# Patient Record
Sex: FEMALE | Employment: PART TIME | URBAN - METROPOLITAN AREA
[De-identification: names, ages, dates, MRNs, and addresses within clinical notes are randomized per-mention and may not be internally consistent; named-entity substitution may affect disease eponyms.]

---

## 2023-05-03 DIAGNOSIS — E11.9 TYPE 2 DIABETES MELLITUS WITHOUT COMPLICATION, WITHOUT LONG-TERM CURRENT USE OF INSULIN (MULTI): ICD-10-CM

## 2023-05-03 RX ORDER — METFORMIN HYDROCHLORIDE 500 MG/1
1000 TABLET, EXTENDED RELEASE ORAL 2 TIMES DAILY
COMMUNITY
End: 2023-05-03 | Stop reason: SDUPTHER

## 2023-05-04 RX ORDER — METFORMIN HYDROCHLORIDE 500 MG/1
1000 TABLET, EXTENDED RELEASE ORAL 2 TIMES DAILY
Qty: 360 TABLET | Refills: 0 | Status: SHIPPED | OUTPATIENT
Start: 2023-05-04 | End: 2023-08-17 | Stop reason: SDUPTHER

## 2023-05-11 ENCOUNTER — TELEPHONE (OUTPATIENT)
Dept: PRIMARY CARE | Facility: CLINIC | Age: 68
End: 2023-05-11

## 2023-05-12 DIAGNOSIS — E55.9 VITAMIN D DEFICIENCY: ICD-10-CM

## 2023-05-12 DIAGNOSIS — E78.5 DYSLIPIDEMIA: ICD-10-CM

## 2023-05-12 DIAGNOSIS — E11.9 TYPE 2 DIABETES MELLITUS TREATED WITHOUT INSULIN (MULTI): Primary | ICD-10-CM

## 2023-05-12 DIAGNOSIS — R79.89 ABNORMAL TSH: ICD-10-CM

## 2023-05-15 ENCOUNTER — LAB (OUTPATIENT)
Dept: LAB | Facility: LAB | Age: 68
End: 2023-05-15
Payer: MEDICARE

## 2023-05-15 DIAGNOSIS — E55.9 VITAMIN D DEFICIENCY: ICD-10-CM

## 2023-05-15 DIAGNOSIS — E11.9 TYPE 2 DIABETES MELLITUS TREATED WITHOUT INSULIN (MULTI): ICD-10-CM

## 2023-05-15 DIAGNOSIS — R79.89 ABNORMAL TSH: ICD-10-CM

## 2023-05-15 DIAGNOSIS — E78.5 DYSLIPIDEMIA: ICD-10-CM

## 2023-05-15 LAB
BASOPHILS (10*3/UL) IN BLOOD BY AUTOMATED COUNT: 0.06 X10E9/L (ref 0–0.1)
BASOPHILS/100 LEUKOCYTES IN BLOOD BY AUTOMATED COUNT: 0.9 % (ref 0–2)
EOSINOPHILS (10*3/UL) IN BLOOD BY AUTOMATED COUNT: 0.68 X10E9/L (ref 0–0.7)
EOSINOPHILS/100 LEUKOCYTES IN BLOOD BY AUTOMATED COUNT: 10.3 % (ref 0–6)
ERYTHROCYTE DISTRIBUTION WIDTH (RATIO) BY AUTOMATED COUNT: 12.2 % (ref 11.5–14.5)
ERYTHROCYTE MEAN CORPUSCULAR HEMOGLOBIN CONCENTRATION (G/DL) BY AUTOMATED: 31.7 G/DL (ref 32–36)
ERYTHROCYTE MEAN CORPUSCULAR VOLUME (FL) BY AUTOMATED COUNT: 92 FL (ref 80–100)
ERYTHROCYTES (10*6/UL) IN BLOOD BY AUTOMATED COUNT: 4.76 X10E12/L (ref 4–5.2)
ESTIMATED AVERAGE GLUCOSE FOR HBA1C: 154 MG/DL
HEMATOCRIT (%) IN BLOOD BY AUTOMATED COUNT: 43.8 % (ref 36–46)
HEMOGLOBIN (G/DL) IN BLOOD: 13.9 G/DL (ref 12–16)
HEMOGLOBIN A1C/HEMOGLOBIN TOTAL IN BLOOD: 7 %
IMMATURE GRANULOCYTES/100 LEUKOCYTES IN BLOOD BY AUTOMATED COUNT: 0.2 % (ref 0–0.9)
LEUKOCYTES (10*3/UL) IN BLOOD BY AUTOMATED COUNT: 6.6 X10E9/L (ref 4.4–11.3)
LYMPHOCYTES (10*3/UL) IN BLOOD BY AUTOMATED COUNT: 1.36 X10E9/L (ref 1.2–4.8)
LYMPHOCYTES/100 LEUKOCYTES IN BLOOD BY AUTOMATED COUNT: 20.5 % (ref 13–44)
MONOCYTES (10*3/UL) IN BLOOD BY AUTOMATED COUNT: 0.57 X10E9/L (ref 0.1–1)
MONOCYTES/100 LEUKOCYTES IN BLOOD BY AUTOMATED COUNT: 8.6 % (ref 2–10)
NEUTROPHILS (10*3/UL) IN BLOOD BY AUTOMATED COUNT: 3.94 X10E9/L (ref 1.2–7.7)
NEUTROPHILS/100 LEUKOCYTES IN BLOOD BY AUTOMATED COUNT: 59.5 % (ref 40–80)
NRBC (PER 100 WBCS) BY AUTOMATED COUNT: 0 /100 WBC (ref 0–0)
PLATELETS (10*3/UL) IN BLOOD AUTOMATED COUNT: 287 X10E9/L (ref 150–450)

## 2023-05-15 PROCEDURE — 80061 LIPID PANEL: CPT

## 2023-05-15 PROCEDURE — 82306 VITAMIN D 25 HYDROXY: CPT

## 2023-05-15 PROCEDURE — 83036 HEMOGLOBIN GLYCOSYLATED A1C: CPT

## 2023-05-15 PROCEDURE — 36415 COLL VENOUS BLD VENIPUNCTURE: CPT

## 2023-05-15 PROCEDURE — 80053 COMPREHEN METABOLIC PANEL: CPT

## 2023-05-15 PROCEDURE — 85025 COMPLETE CBC W/AUTO DIFF WBC: CPT

## 2023-05-15 PROCEDURE — 84443 ASSAY THYROID STIM HORMONE: CPT

## 2023-05-16 ENCOUNTER — OFFICE VISIT (OUTPATIENT)
Dept: PRIMARY CARE | Facility: CLINIC | Age: 68
End: 2023-05-16
Payer: MEDICARE

## 2023-05-16 VITALS
BODY MASS INDEX: 26.46 KG/M2 | HEIGHT: 64 IN | HEART RATE: 82 BPM | WEIGHT: 155 LBS | RESPIRATION RATE: 12 BRPM | SYSTOLIC BLOOD PRESSURE: 130 MMHG | DIASTOLIC BLOOD PRESSURE: 78 MMHG

## 2023-05-16 DIAGNOSIS — E78.5 DYSLIPIDEMIA: ICD-10-CM

## 2023-05-16 DIAGNOSIS — Z78.0 ASYMPTOMATIC POSTMENOPAUSAL ESTROGEN DEFICIENCY: ICD-10-CM

## 2023-05-16 DIAGNOSIS — E11.9 TYPE 2 DIABETES MELLITUS WITHOUT COMPLICATION, WITHOUT LONG-TERM CURRENT USE OF INSULIN (MULTI): ICD-10-CM

## 2023-05-16 DIAGNOSIS — Z00.00 ANNUAL PHYSICAL EXAM: ICD-10-CM

## 2023-05-16 DIAGNOSIS — I10 PRIMARY HYPERTENSION: Primary | ICD-10-CM

## 2023-05-16 LAB
ALANINE AMINOTRANSFERASE (SGPT) (U/L) IN SER/PLAS: 11 U/L (ref 7–45)
ALBUMIN (G/DL) IN SER/PLAS: 4.2 G/DL (ref 3.4–5)
ALKALINE PHOSPHATASE (U/L) IN SER/PLAS: 112 U/L (ref 33–136)
ANION GAP IN SER/PLAS: 17 MMOL/L (ref 10–20)
ASPARTATE AMINOTRANSFERASE (SGOT) (U/L) IN SER/PLAS: 17 U/L (ref 9–39)
BILIRUBIN TOTAL (MG/DL) IN SER/PLAS: 0.6 MG/DL (ref 0–1.2)
CALCIDIOL (25 OH VITAMIN D3) (NG/ML) IN SER/PLAS: 64 NG/ML
CALCIUM (MG/DL) IN SER/PLAS: 10.1 MG/DL (ref 8.6–10.6)
CARBON DIOXIDE, TOTAL (MMOL/L) IN SER/PLAS: 25 MMOL/L (ref 21–32)
CHLORIDE (MMOL/L) IN SER/PLAS: 102 MMOL/L (ref 98–107)
CHOLESTEROL (MG/DL) IN SER/PLAS: 187 MG/DL (ref 0–199)
CHOLESTEROL IN HDL (MG/DL) IN SER/PLAS: 58 MG/DL
CHOLESTEROL/HDL RATIO: 3.2
CREATININE (MG/DL) IN SER/PLAS: 0.77 MG/DL (ref 0.5–1.05)
GFR FEMALE: 84 ML/MIN/1.73M2
GLUCOSE (MG/DL) IN SER/PLAS: 120 MG/DL (ref 74–99)
LDL: 102 MG/DL (ref 0–99)
POTASSIUM (MMOL/L) IN SER/PLAS: 4.6 MMOL/L (ref 3.5–5.3)
PROTEIN TOTAL: 7 G/DL (ref 6.4–8.2)
SODIUM (MMOL/L) IN SER/PLAS: 139 MMOL/L (ref 136–145)
THYROTROPIN (MIU/L) IN SER/PLAS BY DETECTION LIMIT <= 0.05 MIU/L: 1.14 MIU/L (ref 0.44–3.98)
TRIGLYCERIDE (MG/DL) IN SER/PLAS: 137 MG/DL (ref 0–149)
UREA NITROGEN (MG/DL) IN SER/PLAS: 25 MG/DL (ref 6–23)
VLDL: 27 MG/DL (ref 0–40)

## 2023-05-16 PROCEDURE — 3051F HG A1C>EQUAL 7.0%<8.0%: CPT | Performed by: INTERNAL MEDICINE

## 2023-05-16 PROCEDURE — 99214 OFFICE O/P EST MOD 30 MIN: CPT | Performed by: INTERNAL MEDICINE

## 2023-05-16 PROCEDURE — 3075F SYST BP GE 130 - 139MM HG: CPT | Performed by: INTERNAL MEDICINE

## 2023-05-16 PROCEDURE — 4010F ACE/ARB THERAPY RXD/TAKEN: CPT | Performed by: INTERNAL MEDICINE

## 2023-05-16 PROCEDURE — G0439 PPPS, SUBSEQ VISIT: HCPCS | Performed by: INTERNAL MEDICINE

## 2023-05-16 PROCEDURE — 3078F DIAST BP <80 MM HG: CPT | Performed by: INTERNAL MEDICINE

## 2023-05-16 PROCEDURE — 1170F FXNL STATUS ASSESSED: CPT | Performed by: INTERNAL MEDICINE

## 2023-05-16 RX ORDER — OMEPRAZOLE 20 MG/1
20 TABLET, DELAYED RELEASE ORAL
COMMUNITY
Start: 2014-06-19

## 2023-05-16 RX ORDER — INSULIN PUMP SYRINGE, 3 ML
1 EACH MISCELLANEOUS AS NEEDED
COMMUNITY
Start: 2021-01-28

## 2023-05-16 RX ORDER — ATORVASTATIN CALCIUM 10 MG/1
10 TABLET, FILM COATED ORAL DAILY
COMMUNITY
Start: 2022-12-19 | End: 2023-09-25 | Stop reason: SDUPTHER

## 2023-05-16 RX ORDER — DAPAGLIFLOZIN 5 MG/1
5 TABLET, FILM COATED ORAL
COMMUNITY
Start: 2021-07-31 | End: 2023-06-09 | Stop reason: SDUPTHER

## 2023-05-16 RX ORDER — LOSARTAN POTASSIUM 25 MG/1
25 TABLET ORAL DAILY
COMMUNITY
End: 2023-05-16 | Stop reason: SDUPTHER

## 2023-05-16 RX ORDER — LOSARTAN POTASSIUM 25 MG/1
25 TABLET ORAL DAILY
Qty: 90 TABLET | Refills: 3 | Status: SHIPPED | OUTPATIENT
Start: 2023-05-16 | End: 2024-05-07 | Stop reason: SDUPTHER

## 2023-05-16 ASSESSMENT — ACTIVITIES OF DAILY LIVING (ADL)
DRESSING: INDEPENDENT
TAKING_MEDICATION: INDEPENDENT
MANAGING_FINANCES: INDEPENDENT
GROCERY_SHOPPING: INDEPENDENT
BATHING: INDEPENDENT
DOING_HOUSEWORK: INDEPENDENT

## 2023-05-16 ASSESSMENT — ENCOUNTER SYMPTOMS
DEPRESSION: 0
LOSS OF SENSATION IN FEET: 0
OCCASIONAL FEELINGS OF UNSTEADINESS: 0

## 2023-05-16 ASSESSMENT — PATIENT HEALTH QUESTIONNAIRE - PHQ9
2. FEELING DOWN, DEPRESSED OR HOPELESS: NOT AT ALL
SUM OF ALL RESPONSES TO PHQ9 QUESTIONS 1 AND 2: 0
1. LITTLE INTEREST OR PLEASURE IN DOING THINGS: NOT AT ALL

## 2023-05-16 NOTE — PROGRESS NOTES
Subjective   Patient ID: Edith Christian is a 67 y.o. female who presents for annual f/up.    HPI  Pt doing well in the interim. Had some R sided flank pain this past week that radiated to front. No GI/ symptoms. Pain has since improved.    Otherwise, health has been good. No yeast infections since Jan 2023. Compliant with meds. Glucose ~100-150 on POC checks.     Review of Systems  Negative unless stated otherwise    Objective   Vitals:    05/16/23 1036   BP: 130/78   Pulse: 82   Resp: 12     GEN: well appearing, NAD  HEENT: MMM, pharynx clear; no notable LAE  EYES: sclera clear, EOMI intact  CV: RRR, nl S1/S2; wwp  PULM: CTAB, no w/r/r; normal WoB on RA  ABD: Soft, NT, ND; no CVA tenderness  EXT:  no asymmetry, no edema  SKIN: Warm, dry, no rash  NEURO: no focal deficits; CN II-XII grossly intact; AOx3    Assessment/Plan   Ms. Edith Christian is a 66 yo F w PMH T2DM, HTN, HLD, presenting for CPE.    #DMII   #Obesity  -A1c 7.5->6.9->6.8->7.0% in 5/2023  -Continue Metformin 1g BID (pt taking this once a day but encouraged to take twice daily)   -Restarted on farxiga since last visit   -Continue farxiga - using PRN diflucan for associated yeast infections  -deferred switching to GLP-1 as no yeast infection since Jan 2023    #HTN   -Was on Lisinopril, switched to losartan d/t cough  -Continue losartan 25 mg daily    #HLD  -Lipids 5/2023 wnl  -C/w atorvastatin 10mg     #GERD  -c/w omeprazole     #HM   -Pap last year through Gyn  -Mammo 3/22 through Gyn (Dr. George); due now though Dr. Sanders   -Cscope 12/2018: will need follow up in 5 years - referred now  -Pneumovax 12/2021 (was not given in 2020)  -Flu shot UTD  -DEXA 04/2021 low bone mass, ten year risk of major fracture 15% and hip fracture 0.5%; repeat now  -Shingrex completed on 2022   -COVID 4x (most recently 10/2022)

## 2023-05-16 NOTE — PATIENT INSTRUCTIONS
Edith,     Call [phone] to schedule colonoscopy with Dr. Eduardo Marinelli 12.14.23 or later.   Call [phone] to schedule bone density test anytime!   See me back in 6 months!     CL

## 2023-05-16 NOTE — PROGRESS NOTES
I reviewed with the resident the medical history and the resident’s findings on physical examination.  I discussed with the resident the patient’s diagnosis and concur with the treatment plan as documented in the resident note.     Mery Lane MD

## 2023-06-09 DIAGNOSIS — E11.9 TYPE 2 DIABETES MELLITUS WITHOUT COMPLICATION, WITHOUT LONG-TERM CURRENT USE OF INSULIN (MULTI): ICD-10-CM

## 2023-06-09 RX ORDER — DAPAGLIFLOZIN 5 MG/1
5 TABLET, FILM COATED ORAL
Qty: 90 TABLET | Refills: 1 | Status: SHIPPED | OUTPATIENT
Start: 2023-06-09 | End: 2023-12-11 | Stop reason: SDUPTHER

## 2023-06-22 ENCOUNTER — PATIENT OUTREACH (OUTPATIENT)
Dept: PRIMARY CARE | Facility: CLINIC | Age: 68
End: 2023-06-22
Payer: MEDICARE

## 2023-06-22 NOTE — PROGRESS NOTES
Edith Christian   1955   [id number]   [phone]   Mery Lane MD    I called pt and left voice message in regards to [facility] Patient Experience at [facility]. I will follow up with pt again.

## 2023-06-25 DIAGNOSIS — K21.9 GASTROESOPHAGEAL REFLUX DISEASE WITHOUT ESOPHAGITIS: ICD-10-CM

## 2023-06-26 RX ORDER — OMEPRAZOLE 20 MG/1
20 CAPSULE, DELAYED RELEASE ORAL EVERY MORNING
Qty: 100 CAPSULE | Refills: 1 | Status: SHIPPED | OUTPATIENT
Start: 2023-06-26 | End: 2023-12-31

## 2023-08-17 DIAGNOSIS — E11.9 TYPE 2 DIABETES MELLITUS WITHOUT COMPLICATION, WITHOUT LONG-TERM CURRENT USE OF INSULIN (MULTI): ICD-10-CM

## 2023-08-17 RX ORDER — METFORMIN HYDROCHLORIDE 500 MG/1
1000 TABLET, EXTENDED RELEASE ORAL 2 TIMES DAILY
Qty: 360 TABLET | Refills: 3 | Status: SHIPPED | OUTPATIENT
Start: 2023-08-17 | End: 2024-05-07 | Stop reason: SDUPTHER

## 2023-09-25 DIAGNOSIS — E78.5 DYSLIPIDEMIA: ICD-10-CM

## 2023-09-27 RX ORDER — ATORVASTATIN CALCIUM 10 MG/1
10 TABLET, FILM COATED ORAL DAILY
Qty: 90 TABLET | Refills: 2 | Status: SHIPPED | OUTPATIENT
Start: 2023-09-27 | End: 2024-05-07 | Stop reason: SDUPTHER

## 2023-11-17 ENCOUNTER — LAB (OUTPATIENT)
Dept: LAB | Facility: LAB | Age: 68
End: 2023-11-17
Payer: MEDICARE

## 2023-11-17 ENCOUNTER — APPOINTMENT (OUTPATIENT)
Dept: PRIMARY CARE | Facility: CLINIC | Age: 68
End: 2023-11-17
Payer: MEDICARE

## 2023-11-17 DIAGNOSIS — E11.9 TYPE 2 DIABETES MELLITUS WITHOUT COMPLICATION, WITHOUT LONG-TERM CURRENT USE OF INSULIN (MULTI): ICD-10-CM

## 2023-11-17 LAB
ANION GAP SERPL CALC-SCNC: 16 MMOL/L (ref 10–20)
BUN SERPL-MCNC: 22 MG/DL (ref 6–23)
CALCIUM SERPL-MCNC: 9.8 MG/DL (ref 8.6–10.6)
CHLORIDE SERPL-SCNC: 100 MMOL/L (ref 98–107)
CO2 SERPL-SCNC: 26 MMOL/L (ref 21–32)
CREAT SERPL-MCNC: 0.77 MG/DL (ref 0.5–1.05)
EST. AVERAGE GLUCOSE BLD GHB EST-MCNC: 151 MG/DL
GFR SERPL CREATININE-BSD FRML MDRD: 84 ML/MIN/1.73M*2
GLUCOSE SERPL-MCNC: 132 MG/DL (ref 74–99)
HBA1C MFR BLD: 6.9 %
POTASSIUM SERPL-SCNC: 4.5 MMOL/L (ref 3.5–5.3)
SODIUM SERPL-SCNC: 137 MMOL/L (ref 136–145)

## 2023-11-17 PROCEDURE — 83036 HEMOGLOBIN GLYCOSYLATED A1C: CPT

## 2023-11-17 PROCEDURE — 80048 BASIC METABOLIC PNL TOTAL CA: CPT

## 2023-11-17 PROCEDURE — 36415 COLL VENOUS BLD VENIPUNCTURE: CPT

## 2023-11-22 ENCOUNTER — OFFICE VISIT (OUTPATIENT)
Dept: PRIMARY CARE | Facility: CLINIC | Age: 68
End: 2023-11-22
Payer: MEDICARE

## 2023-11-22 VITALS
DIASTOLIC BLOOD PRESSURE: 77 MMHG | BODY MASS INDEX: 27.12 KG/M2 | WEIGHT: 158 LBS | SYSTOLIC BLOOD PRESSURE: 137 MMHG | OXYGEN SATURATION: 98 % | HEART RATE: 80 BPM

## 2023-11-22 DIAGNOSIS — Z12.31 BREAST CANCER SCREENING BY MAMMOGRAM: Primary | ICD-10-CM

## 2023-11-22 PROCEDURE — 99397 PER PM REEVAL EST PAT 65+ YR: CPT | Performed by: INTERNAL MEDICINE

## 2023-11-22 PROCEDURE — 1170F FXNL STATUS ASSESSED: CPT | Performed by: INTERNAL MEDICINE

## 2023-11-22 PROCEDURE — 4010F ACE/ARB THERAPY RXD/TAKEN: CPT | Performed by: INTERNAL MEDICINE

## 2023-11-22 PROCEDURE — 3075F SYST BP GE 130 - 139MM HG: CPT | Performed by: INTERNAL MEDICINE

## 2023-11-22 PROCEDURE — 3078F DIAST BP <80 MM HG: CPT | Performed by: INTERNAL MEDICINE

## 2023-11-22 PROCEDURE — 3044F HG A1C LEVEL LT 7.0%: CPT | Performed by: INTERNAL MEDICINE

## 2023-11-22 ASSESSMENT — ENCOUNTER SYMPTOMS
CONSTIPATION: 0
CHILLS: 0
SINUS PAIN: 0
NAUSEA: 0
LOSS OF SENSATION IN FEET: 0
OCCASIONAL FEELINGS OF UNSTEADINESS: 0
ACTIVITY CHANGE: 0
DIFFICULTY URINATING: 0
DEPRESSION: 0
FEVER: 0
DIARRHEA: 0
SHORTNESS OF BREATH: 0
ABDOMINAL PAIN: 0
CHEST TIGHTNESS: 0
TROUBLE SWALLOWING: 0
DYSURIA: 0

## 2023-11-22 ASSESSMENT — ACTIVITIES OF DAILY LIVING (ADL)
DOING_HOUSEWORK: INDEPENDENT
DRESSING: INDEPENDENT
BATHING: INDEPENDENT
MANAGING_FINANCES: INDEPENDENT
TAKING_MEDICATION: INDEPENDENT
GROCERY_SHOPPING: INDEPENDENT

## 2023-11-22 ASSESSMENT — PATIENT HEALTH QUESTIONNAIRE - PHQ9
2. FEELING DOWN, DEPRESSED OR HOPELESS: NOT AT ALL
1. LITTLE INTEREST OR PLEASURE IN DOING THINGS: NOT AT ALL
SUM OF ALL RESPONSES TO PHQ9 QUESTIONS 1 AND 2: 0

## 2023-11-22 NOTE — PATIENT INSTRUCTIONS
Edith,     See me back in 6 months, sooner if needed.   Mammogram will be due 3-, I'll place the order & you can call radiology at [phone] to schedule.       CL

## 2023-11-22 NOTE — PROGRESS NOTES
Subjective   Patient ID: Edith Christian is a 68 y.o. female who presents for annual physical    HPI     She reports she is doing well and has no complaints. Discussed that her A1c didn't improve. Encouraged intermittent fasting for lifestyle modifications.    Review of Systems   Constitutional:  Negative for activity change, chills and fever.   HENT:  Negative for ear discharge, ear pain, sinus pain and trouble swallowing.    Respiratory:  Negative for chest tightness and shortness of breath.    Cardiovascular:  Negative for chest pain.   Gastrointestinal:  Negative for abdominal pain, constipation, diarrhea and nausea.   Genitourinary:  Negative for difficulty urinating and dysuria.       Objective   /77   Pulse 80   Wt 71.7 kg (158 lb)   SpO2 98%   BMI 27.12 kg/m²     Physical Exam  Constitutional:       General: She is not in acute distress.     Appearance: Normal appearance.   HENT:      Head: Normocephalic and atraumatic.      Right Ear: External ear normal.      Left Ear: External ear normal.      Mouth/Throat:      Mouth: Mucous membranes are moist.      Pharynx: Oropharynx is clear.   Eyes:      Extraocular Movements: Extraocular movements intact.      Conjunctiva/sclera: Conjunctivae normal.      Pupils: Pupils are equal, round, and reactive to light.   Cardiovascular:      Rate and Rhythm: Normal rate and regular rhythm.      Heart sounds: No murmur heard.     No gallop.   Pulmonary:      Effort: Pulmonary effort is normal. No respiratory distress.      Breath sounds: Normal breath sounds. No stridor.   Abdominal:      General: Abdomen is flat. Bowel sounds are normal.      Palpations: Abdomen is soft. There is no mass.      Tenderness: There is no abdominal tenderness. There is no guarding.   Musculoskeletal:      Cervical back: Neck supple.   Skin:     General: Skin is warm.      Coloration: Skin is not jaundiced.      Findings: No bruising, erythema or rash.   Neurological:      General: No  focal deficit present.      Mental Status: She is alert and oriented to person, place, and time.   Psychiatric:         Mood and Affect: Mood normal.         Thought Content: Thought content normal.         Assessment/Plan     Ms. Edith Christian is a 66 yo F w PMH T2DM, HTN, HLD, presenting for CPE.     #DMII   #Obesity  -A1c 7.5->6.9->6.8->7.0>6.9  -Continue Metformin 1g BID   -Cont farxiga since (no yeast infection since Jan)  -Discussed w pt starting GLP-1 agonists, elected to defer until next visit in 3 months  -Repeat A1c in 3 months     #HTN   -Continue losartan 25 mg daily     #HLD  -Lipids 5/2023 wnl  -C/w atorvastatin 10mg      #GERD  -c/w omeprazole     Health Maintenance:  1- Due for colon in Dec 2023 (5 years, brother history colon cancer)  2- HPV testing no longer indicated  4- Dexa scan due  5- mammo done in March 2024, next due March 2024  6- Tdap 2016 > 2026   7- shingrix completed in 2022  9- Pt to call [facility] to confirm she completed Pna vaccine (unsure if she got PCV)  10- flu shot completed

## 2023-11-27 NOTE — PROGRESS NOTES
I saw and evaluated the patient. I personally obtained the key and critical portions of the history and physical exam or was physically present for key and critical portions performed by the resident/fellow. I reviewed the resident/fellow's documentation and discussed the patient with the resident/fellow. I agree with the resident/fellow's medical decision making as documented in the note.    Mery Lane MD

## 2023-12-11 DIAGNOSIS — E11.9 TYPE 2 DIABETES MELLITUS WITHOUT COMPLICATION, WITHOUT LONG-TERM CURRENT USE OF INSULIN (MULTI): ICD-10-CM

## 2023-12-11 RX ORDER — DAPAGLIFLOZIN 5 MG/1
5 TABLET, FILM COATED ORAL
Qty: 90 TABLET | Refills: 1 | Status: SHIPPED | OUTPATIENT
Start: 2023-12-11 | End: 2024-05-07 | Stop reason: SDUPTHER

## 2023-12-29 ENCOUNTER — APPOINTMENT (OUTPATIENT)
Dept: RADIOLOGY | Facility: HOSPITAL | Age: 68
End: 2023-12-29
Payer: MEDICARE

## 2023-12-29 DIAGNOSIS — K21.9 GASTROESOPHAGEAL REFLUX DISEASE WITHOUT ESOPHAGITIS: ICD-10-CM

## 2023-12-31 RX ORDER — OMEPRAZOLE 20 MG/1
20 CAPSULE, DELAYED RELEASE ORAL EVERY MORNING
Qty: 100 CAPSULE | Refills: 2 | Status: SHIPPED | OUTPATIENT
Start: 2023-12-31

## 2024-01-22 DIAGNOSIS — Z86.010 PERSONAL HISTORY OF COLONIC POLYPS: ICD-10-CM

## 2024-01-22 RX ORDER — POLYETHYLENE GLYCOL 3350, SODIUM SULFATE ANHYDROUS, SODIUM BICARBONATE, SODIUM CHLORIDE, POTASSIUM CHLORIDE 236; 22.74; 6.74; 5.86; 2.97 G/4L; G/4L; G/4L; G/4L; G/4L
4000 POWDER, FOR SOLUTION ORAL ONCE
Qty: 4000 ML | Refills: 0 | Status: SHIPPED | OUTPATIENT
Start: 2024-01-22 | End: 2024-01-22

## 2024-01-25 ENCOUNTER — HOSPITAL ENCOUNTER (OUTPATIENT)
Dept: RADIOLOGY | Facility: HOSPITAL | Age: 69
Discharge: HOME | End: 2024-01-25
Payer: MEDICARE

## 2024-01-25 DIAGNOSIS — Z78.0 ASYMPTOMATIC POSTMENOPAUSAL ESTROGEN DEFICIENCY: ICD-10-CM

## 2024-01-25 PROCEDURE — 77085 DXA BONE DENSITY AXL VRT FX: CPT

## 2024-04-04 ENCOUNTER — HOSPITAL ENCOUNTER (OUTPATIENT)
Dept: RADIOLOGY | Facility: HOSPITAL | Age: 69
Discharge: HOME | End: 2024-04-04
Payer: MEDICARE

## 2024-04-04 VITALS — BODY MASS INDEX: 26.29 KG/M2 | HEIGHT: 64 IN | WEIGHT: 154 LBS

## 2024-04-04 DIAGNOSIS — Z12.31 BREAST CANCER SCREENING BY MAMMOGRAM: ICD-10-CM

## 2024-04-04 PROCEDURE — 77067 SCR MAMMO BI INCL CAD: CPT

## 2024-04-04 PROCEDURE — 77067 SCR MAMMO BI INCL CAD: CPT | Performed by: STUDENT IN AN ORGANIZED HEALTH CARE EDUCATION/TRAINING PROGRAM

## 2024-04-04 PROCEDURE — 77063 BREAST TOMOSYNTHESIS BI: CPT | Performed by: STUDENT IN AN ORGANIZED HEALTH CARE EDUCATION/TRAINING PROGRAM

## 2024-04-17 ENCOUNTER — APPOINTMENT (OUTPATIENT)
Dept: GASTROENTEROLOGY | Facility: EXTERNAL LOCATION | Age: 69
End: 2024-04-17
Payer: MEDICARE

## 2024-05-03 ENCOUNTER — OFFICE VISIT (OUTPATIENT)
Dept: GASTROENTEROLOGY | Facility: EXTERNAL LOCATION | Age: 69
End: 2024-05-03
Payer: MEDICARE

## 2024-05-03 DIAGNOSIS — Z80.0 FAMILY HISTORY OF MALIGNANT NEOPLASM OF DIGESTIVE ORGANS: ICD-10-CM

## 2024-05-03 DIAGNOSIS — Z12.11 COLON CANCER SCREENING: ICD-10-CM

## 2024-05-03 DIAGNOSIS — Z86.010 PERSONAL HISTORY OF COLONIC POLYPS: ICD-10-CM

## 2024-05-03 DIAGNOSIS — D12.2 BENIGN NEOPLASM OF ASCENDING COLON: ICD-10-CM

## 2024-05-03 DIAGNOSIS — Z86.010 PERSONAL HISTORY OF COLONIC POLYPS: Primary | ICD-10-CM

## 2024-05-03 PROCEDURE — 88305 TISSUE EXAM BY PATHOLOGIST: CPT

## 2024-05-03 PROCEDURE — 88305 TISSUE EXAM BY PATHOLOGIST: CPT | Performed by: STUDENT IN AN ORGANIZED HEALTH CARE EDUCATION/TRAINING PROGRAM

## 2024-05-03 PROCEDURE — 4010F ACE/ARB THERAPY RXD/TAKEN: CPT | Performed by: INTERNAL MEDICINE

## 2024-05-03 PROCEDURE — 45385 COLONOSCOPY W/LESION REMOVAL: CPT | Performed by: INTERNAL MEDICINE

## 2024-05-03 NOTE — PROGRESS NOTES
Patient has a family history of colon cancer and multiple polyps were removed today.  I recommend repeat in 3 years.

## 2024-05-06 ENCOUNTER — LAB REQUISITION (OUTPATIENT)
Dept: LAB | Facility: HOSPITAL | Age: 69
End: 2024-05-06
Payer: MEDICARE

## 2024-05-07 DIAGNOSIS — I10 PRIMARY HYPERTENSION: ICD-10-CM

## 2024-05-07 DIAGNOSIS — E11.9 TYPE 2 DIABETES MELLITUS WITHOUT COMPLICATION, WITHOUT LONG-TERM CURRENT USE OF INSULIN (MULTI): ICD-10-CM

## 2024-05-07 DIAGNOSIS — E78.5 DYSLIPIDEMIA: ICD-10-CM

## 2024-05-07 RX ORDER — BLOOD SUGAR DIAGNOSTIC
STRIP MISCELLANEOUS
COMMUNITY
Start: 2024-01-05 | End: 2024-05-07 | Stop reason: SDUPTHER

## 2024-05-07 RX ORDER — LOSARTAN POTASSIUM 25 MG/1
25 TABLET ORAL DAILY
Qty: 90 TABLET | Refills: 1 | Status: SHIPPED | OUTPATIENT
Start: 2024-05-07

## 2024-05-07 RX ORDER — METFORMIN HYDROCHLORIDE 500 MG/1
1000 TABLET, EXTENDED RELEASE ORAL 2 TIMES DAILY
Qty: 360 TABLET | Refills: 1 | Status: SHIPPED | OUTPATIENT
Start: 2024-05-07

## 2024-05-07 RX ORDER — DAPAGLIFLOZIN 5 MG/1
5 TABLET, FILM COATED ORAL
Qty: 90 TABLET | Refills: 1 | Status: SHIPPED | OUTPATIENT
Start: 2024-05-07

## 2024-05-07 RX ORDER — BLOOD SUGAR DIAGNOSTIC
STRIP MISCELLANEOUS
Qty: 200 STRIP | Refills: 1 | Status: SHIPPED | OUTPATIENT
Start: 2024-05-07

## 2024-05-07 RX ORDER — ATORVASTATIN CALCIUM 10 MG/1
10 TABLET, FILM COATED ORAL DAILY
Qty: 90 TABLET | Refills: 1 | Status: SHIPPED | OUTPATIENT
Start: 2024-05-07

## 2024-05-09 ENCOUNTER — TELEPHONE (OUTPATIENT)
Dept: PRIMARY CARE | Facility: CLINIC | Age: 69
End: 2024-05-09
Payer: MEDICARE

## 2024-05-13 DIAGNOSIS — E11.9 TYPE 2 DIABETES MELLITUS WITHOUT COMPLICATION, WITHOUT LONG-TERM CURRENT USE OF INSULIN (MULTI): Primary | ICD-10-CM

## 2024-05-14 ENCOUNTER — LAB (OUTPATIENT)
Dept: LAB | Facility: LAB | Age: 69
End: 2024-05-14
Payer: MEDICARE

## 2024-05-14 DIAGNOSIS — E11.9 TYPE 2 DIABETES MELLITUS WITHOUT COMPLICATION, WITHOUT LONG-TERM CURRENT USE OF INSULIN (MULTI): ICD-10-CM

## 2024-05-14 LAB
ANION GAP SERPL CALC-SCNC: 14 MMOL/L (ref 10–20)
BUN SERPL-MCNC: 18 MG/DL (ref 6–23)
CALCIUM SERPL-MCNC: 9.6 MG/DL (ref 8.6–10.6)
CHLORIDE SERPL-SCNC: 102 MMOL/L (ref 98–107)
CO2 SERPL-SCNC: 27 MMOL/L (ref 21–32)
CREAT SERPL-MCNC: 0.84 MG/DL (ref 0.5–1.05)
EGFRCR SERPLBLD CKD-EPI 2021: 76 ML/MIN/1.73M*2
EST. AVERAGE GLUCOSE BLD GHB EST-MCNC: 171 MG/DL
GLUCOSE SERPL-MCNC: 132 MG/DL (ref 74–99)
HBA1C MFR BLD: 7.6 %
POTASSIUM SERPL-SCNC: 4.7 MMOL/L (ref 3.5–5.3)
SODIUM SERPL-SCNC: 138 MMOL/L (ref 136–145)

## 2024-05-14 PROCEDURE — 80048 BASIC METABOLIC PNL TOTAL CA: CPT

## 2024-05-14 PROCEDURE — 36415 COLL VENOUS BLD VENIPUNCTURE: CPT

## 2024-05-14 PROCEDURE — 83036 HEMOGLOBIN GLYCOSYLATED A1C: CPT

## 2024-05-16 DIAGNOSIS — E11.9 TYPE 2 DIABETES MELLITUS WITHOUT COMPLICATION, WITHOUT LONG-TERM CURRENT USE OF INSULIN (MULTI): Primary | ICD-10-CM

## 2024-05-16 DIAGNOSIS — B37.31 VULVOVAGINAL CANDIDIASIS: ICD-10-CM

## 2024-05-16 LAB
LABORATORY COMMENT REPORT: NORMAL
PATH REPORT.FINAL DX SPEC: NORMAL
PATH REPORT.GROSS SPEC: NORMAL
PATH REPORT.RELEVANT HX SPEC: NORMAL
PATH REPORT.TOTAL CANCER: NORMAL

## 2024-05-16 RX ORDER — FLUCONAZOLE 150 MG/1
TABLET ORAL
Qty: 6 TABLET | Refills: 0 | Status: SHIPPED | OUTPATIENT
Start: 2024-05-16

## 2024-05-16 RX ORDER — DAPAGLIFLOZIN 10 MG/1
10 TABLET, FILM COATED ORAL DAILY
Qty: 100 TABLET | Refills: 3 | Status: SHIPPED | OUTPATIENT
Start: 2024-05-16 | End: 2025-06-20

## 2024-05-22 ENCOUNTER — APPOINTMENT (OUTPATIENT)
Dept: PRIMARY CARE | Facility: CLINIC | Age: 69
End: 2024-05-22
Payer: MEDICARE

## 2024-09-10 DIAGNOSIS — K21.9 GASTROESOPHAGEAL REFLUX DISEASE WITHOUT ESOPHAGITIS: ICD-10-CM

## 2024-09-10 RX ORDER — OMEPRAZOLE 20 MG/1
20 CAPSULE, DELAYED RELEASE ORAL EVERY MORNING
Qty: 100 CAPSULE | Refills: 2 | Status: SHIPPED | OUTPATIENT
Start: 2024-09-10

## 2024-11-25 ENCOUNTER — APPOINTMENT (OUTPATIENT)
Dept: PRIMARY CARE | Facility: CLINIC | Age: 69
End: 2024-11-25
Payer: MEDICARE

## 2024-11-25 ENCOUNTER — LAB (OUTPATIENT)
Dept: LAB | Facility: LAB | Age: 69
End: 2024-11-25
Payer: MEDICARE

## 2024-11-25 VITALS
SYSTOLIC BLOOD PRESSURE: 142 MMHG | OXYGEN SATURATION: 97 % | HEART RATE: 75 BPM | WEIGHT: 150 LBS | TEMPERATURE: 98.2 F | DIASTOLIC BLOOD PRESSURE: 80 MMHG | BODY MASS INDEX: 24.99 KG/M2 | RESPIRATION RATE: 20 BRPM | HEIGHT: 65 IN

## 2024-11-25 DIAGNOSIS — Z12.31 BREAST CANCER SCREENING BY MAMMOGRAM: ICD-10-CM

## 2024-11-25 DIAGNOSIS — E11.9 TYPE 2 DIABETES MELLITUS WITHOUT COMPLICATION, WITHOUT LONG-TERM CURRENT USE OF INSULIN (MULTI): ICD-10-CM

## 2024-11-25 DIAGNOSIS — Z00.00 ANNUAL PHYSICAL EXAM: Primary | ICD-10-CM

## 2024-11-25 LAB
ALBUMIN SERPL BCP-MCNC: 4.5 G/DL (ref 3.4–5)
ALP SERPL-CCNC: 88 U/L (ref 33–136)
ALT SERPL W P-5'-P-CCNC: 14 U/L (ref 7–45)
ANION GAP SERPL CALC-SCNC: 16 MMOL/L (ref 10–20)
AST SERPL W P-5'-P-CCNC: 16 U/L (ref 9–39)
BILIRUB SERPL-MCNC: 0.7 MG/DL (ref 0–1.2)
BUN SERPL-MCNC: 20 MG/DL (ref 6–23)
CALCIUM SERPL-MCNC: 9.7 MG/DL (ref 8.6–10.6)
CHLORIDE SERPL-SCNC: 104 MMOL/L (ref 98–107)
CHOLEST SERPL-MCNC: 177 MG/DL (ref 0–199)
CHOLESTEROL/HDL RATIO: 3
CO2 SERPL-SCNC: 24 MMOL/L (ref 21–32)
CREAT SERPL-MCNC: 0.77 MG/DL (ref 0.5–1.05)
CREAT UR-MCNC: 22.7 MG/DL (ref 20–320)
EGFRCR SERPLBLD CKD-EPI 2021: 84 ML/MIN/1.73M*2
EST. AVERAGE GLUCOSE BLD GHB EST-MCNC: 169 MG/DL
GLUCOSE SERPL-MCNC: 117 MG/DL (ref 74–99)
HBA1C MFR BLD: 7.5 %
HDLC SERPL-MCNC: 59.9 MG/DL
LDLC SERPL CALC-MCNC: 94 MG/DL
MICROALBUMIN UR-MCNC: <7 MG/L
MICROALBUMIN/CREAT UR: NORMAL MG/G{CREAT}
NON HDL CHOLESTEROL: 117 MG/DL (ref 0–149)
POTASSIUM SERPL-SCNC: 4.3 MMOL/L (ref 3.5–5.3)
PROT SERPL-MCNC: 6.9 G/DL (ref 6.4–8.2)
SODIUM SERPL-SCNC: 140 MMOL/L (ref 136–145)
TRIGL SERPL-MCNC: 117 MG/DL (ref 0–149)
VLDL: 23 MG/DL (ref 0–40)

## 2024-11-25 PROCEDURE — 82570 ASSAY OF URINE CREATININE: CPT

## 2024-11-25 PROCEDURE — 36415 COLL VENOUS BLD VENIPUNCTURE: CPT

## 2024-11-25 PROCEDURE — 3048F LDL-C <100 MG/DL: CPT | Performed by: STUDENT IN AN ORGANIZED HEALTH CARE EDUCATION/TRAINING PROGRAM

## 2024-11-25 PROCEDURE — 1170F FXNL STATUS ASSESSED: CPT | Performed by: STUDENT IN AN ORGANIZED HEALTH CARE EDUCATION/TRAINING PROGRAM

## 2024-11-25 PROCEDURE — 3079F DIAST BP 80-89 MM HG: CPT | Performed by: STUDENT IN AN ORGANIZED HEALTH CARE EDUCATION/TRAINING PROGRAM

## 2024-11-25 PROCEDURE — 1159F MED LIST DOCD IN RCRD: CPT | Performed by: STUDENT IN AN ORGANIZED HEALTH CARE EDUCATION/TRAINING PROGRAM

## 2024-11-25 PROCEDURE — 83036 HEMOGLOBIN GLYCOSYLATED A1C: CPT

## 2024-11-25 PROCEDURE — 3062F POS MACROALBUMINURIA REV: CPT | Performed by: STUDENT IN AN ORGANIZED HEALTH CARE EDUCATION/TRAINING PROGRAM

## 2024-11-25 PROCEDURE — 82043 UR ALBUMIN QUANTITATIVE: CPT

## 2024-11-25 PROCEDURE — G0439 PPPS, SUBSEQ VISIT: HCPCS | Performed by: STUDENT IN AN ORGANIZED HEALTH CARE EDUCATION/TRAINING PROGRAM

## 2024-11-25 PROCEDURE — 4010F ACE/ARB THERAPY RXD/TAKEN: CPT | Performed by: STUDENT IN AN ORGANIZED HEALTH CARE EDUCATION/TRAINING PROGRAM

## 2024-11-25 PROCEDURE — 3051F HG A1C>EQUAL 7.0%<8.0%: CPT | Performed by: STUDENT IN AN ORGANIZED HEALTH CARE EDUCATION/TRAINING PROGRAM

## 2024-11-25 PROCEDURE — 3008F BODY MASS INDEX DOCD: CPT | Performed by: STUDENT IN AN ORGANIZED HEALTH CARE EDUCATION/TRAINING PROGRAM

## 2024-11-25 PROCEDURE — 80061 LIPID PANEL: CPT

## 2024-11-25 PROCEDURE — 3077F SYST BP >= 140 MM HG: CPT | Performed by: STUDENT IN AN ORGANIZED HEALTH CARE EDUCATION/TRAINING PROGRAM

## 2024-11-25 PROCEDURE — 80053 COMPREHEN METABOLIC PANEL: CPT

## 2024-11-25 ASSESSMENT — ENCOUNTER SYMPTOMS
TROUBLE SWALLOWING: 0
SHORTNESS OF BREATH: 0
NAUSEA: 0
DIARRHEA: 0
CONSTIPATION: 0
DYSURIA: 0
ACTIVITY CHANGE: 0
DIFFICULTY URINATING: 0
SINUS PAIN: 0
FEVER: 0
CHEST TIGHTNESS: 0
CHILLS: 0
ABDOMINAL PAIN: 0

## 2024-11-25 ASSESSMENT — PATIENT HEALTH QUESTIONNAIRE - PHQ9
SUM OF ALL RESPONSES TO PHQ9 QUESTIONS 1 AND 2: 0
1. LITTLE INTEREST OR PLEASURE IN DOING THINGS: NOT AT ALL
2. FEELING DOWN, DEPRESSED OR HOPELESS: NOT AT ALL

## 2024-11-25 NOTE — PROGRESS NOTES
"Subjective   Patient ID: Edith Christian is a 69 y.o. female with a hx of T2DM, HTN, HLD  presents for establishing care.    HPI     Acute Concerns: Pt wants to know if she can get CGM.    Chronic issues:    Pt reports her blood pressure at home 106/62 in September, but in the office was 151/83, repeat manual was 142/80. Reports not checking her BP too often because her pressure has typically been controlled. Regarding T2DM her A1c increased to 7.6 in May 2024, taking metformin and farxiga without any issue. Reports diet has not been great due to stressors from her  who is being treated for brain cancer right now. Fasted sugars in the morning have been 140-160. Reports going to the [employer] 5x times a week, but is not able to go as often due to  being sick. Review of systems otherwise negative.    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   HENT:  Negative for ear discharge, ear pain, sinus pain and trouble swallowing.    Respiratory:  Negative for chest tightness and shortness of breath.    Cardiovascular:  Negative for chest pain.   Gastrointestinal:  Negative for abdominal pain, constipation, diarrhea and nausea.   Genitourinary:  Negative for difficulty urinating and dysuria.       Objective   /83 (BP Location: Right arm, Patient Position: Sitting, BP Cuff Size: Adult)   Pulse 75   Temp 36.8 °C (98.2 °F) (Oral)   Resp 20   Ht 1.638 m (5' 4.5\")   Wt 68 kg (150 lb)   SpO2 97%   BMI 25.35 kg/m²     Physical Exam  Constitutional:       General: She is not in acute distress.     Appearance: Normal appearance.   HENT:      Head: Normocephalic and atraumatic.      Right Ear: External ear normal.      Left Ear: External ear normal.      Mouth/Throat:      Mouth: Mucous membranes are moist.      Pharynx: Oropharynx is clear.   Eyes: "      Extraocular Movements: Extraocular movements intact.      Conjunctiva/sclera: Conjunctivae normal.      Pupils: Pupils are equal, round, and reactive to light.   Cardiovascular:      Rate and Rhythm: Normal rate and regular rhythm.      Heart sounds: No murmur heard.     No gallop.   Pulmonary:      Effort: Pulmonary effort is normal. No respiratory distress.      Breath sounds: Normal breath sounds. No stridor.   Abdominal:      General: Abdomen is flat. Bowel sounds are normal.      Palpations: Abdomen is soft. There is no mass.      Tenderness: There is no abdominal tenderness. There is no guarding.   Musculoskeletal:      Cervical back: Neck supple.   Skin:     General: Skin is warm.      Coloration: Skin is not jaundiced.      Findings: No bruising, erythema or rash.   Neurological:      General: No focal deficit present.      Mental Status: She is alert and oriented to person, place, and time.   Psychiatric:         Mood and Affect: Mood normal.         Thought Content: Thought content normal.         Assessment/Plan     Ms. Edith Christian is a 68 yo F w PMH T2DM, HTN, HLD, presenting for establishing care.     #DMII   #Obesity  -A1c 7.5->6.9->6.8->7.0>6.9 ->7.6  -Continue Metformin 1g BID   -Cont farxiga 10 mg   -Pharmacy referral for CGM and diabetes management  -Consider GLP in future   -Repeat A1c today, CMP, Lipids, Urine albumin/Cr ratio      #HTN   -Blood pressure 140/80 in office  -Will do ambulatory checks and uptitrate medication as needed  -Continue losartan 25 mg daily     #HLD  -Lipids 5/2023 showed LDL at 102  -Pt not interested in uptitration of statin  -C/w atorvastatin 10mg      #GERD  -c/w omeprazole     RTC in February 2025    Health Maintenance:  1- Due for colonoscopy in may 2027  2- HPV testing no longer indicated  4- Dexa scan due in 2-3 years, osteopenia on Dexa scan  5- mammo done in March 2024, due April 2025  6- Tdap 2016 > 2026   7- shingrix completed in 2022  9- Due for Prevnar  20

## 2024-11-25 NOTE — PATIENT INSTRUCTIONS
Thank you for coming today Edith!    Please get your blood work. You can get this at any  lab, the one here is on the lower level in [address].       Please check your blood pressure daily for the next two weeks and write down your values. Please send me a [employer] message once you have the values.    I am going to have our pharmacy team here reach out to you about getting a continuous glucose monitor and to help with diabetes management.    We discussed that you are due for a prevnar 20 vaccination.     Please follow up in February for blood pressure and diabetes.

## 2024-12-01 ASSESSMENT — ACTIVITIES OF DAILY LIVING (ADL)
MANAGING_FINANCES: INDEPENDENT
DOING_HOUSEWORK: INDEPENDENT
TAKING_MEDICATION: INDEPENDENT
GROCERY_SHOPPING: INDEPENDENT
BATHING: INDEPENDENT
DRESSING: INDEPENDENT

## 2024-12-01 ASSESSMENT — ENCOUNTER SYMPTOMS
DEPRESSION: 0
OCCASIONAL FEELINGS OF UNSTEADINESS: 0
LOSS OF SENSATION IN FEET: 0

## 2024-12-04 ENCOUNTER — APPOINTMENT (OUTPATIENT)
Dept: PHARMACY | Facility: HOSPITAL | Age: 69
End: 2024-12-04
Payer: MEDICARE

## 2024-12-04 DIAGNOSIS — E11.9 TYPE 2 DIABETES MELLITUS WITHOUT COMPLICATION, WITHOUT LONG-TERM CURRENT USE OF INSULIN (MULTI): ICD-10-CM

## 2024-12-04 RX ORDER — BLOOD-GLUCOSE SENSOR
EACH MISCELLANEOUS
Qty: 2 EACH | Refills: 3 | Status: SHIPPED | OUTPATIENT
Start: 2024-12-04

## 2024-12-04 RX ORDER — DAPAGLIFLOZIN 10 MG/1
10 TABLET, FILM COATED ORAL DAILY
Qty: 90 TABLET | Refills: 3 | Status: SHIPPED | OUTPATIENT
Start: 2024-12-04

## 2024-12-04 RX ORDER — BLOOD-GLUCOSE,RECEIVER,CONT
EACH MISCELLANEOUS
Qty: 1 EACH | Refills: 2 | Status: SHIPPED | OUTPATIENT
Start: 2024-12-04

## 2024-12-04 NOTE — ASSESSMENT & PLAN NOTE
Patient's goal A1c is < 7%.  Is pt at goal? No, 7.5  Patient's SMBGs are 140-160s.     Rationale for plan: pt will be getting new insurance near the end of December and confirmed that CGMs will be covered under her new insurance. Will send Dexcom G7 in advance, pt aware she can notify clinical pharmacy if insurance req. Vicki. Pt is also agrees GLP-1 initiation, plan is to start Ozempic .25 mg; however, current insurance has high copays ($250+). Talked with patient about [employer] PAP and is highly interested, pt will send in income documents. Plan is to first enroll patient in [facility] PAP to eliminate high copay costs, send in prescriptions for Farxiga and Ozempic 0.25 mg (to be put on hold till new insurance starts and [facility] PAP approved), send in Dexcom G7, and to monitor fasting BG till next appt. Pt agreed to better diet and to notify clinical pharmacy if drastic changes seen in BG.     Medication Changes:  CONTINUE  Farxiga 10 mg daily  Metformin  mg 2 tabs bid    Future Considerations:  Start Dexcom G7 when new insurance starts. Notify clinical pharmacy if insurance requires other brands  Continue taking home supply of Farxiga 10 mg. New prescription will be sent in but not filled till you get your new insurance and [facility] PAP approved.   Ozempic 0.25 mg will be sent to our [facility] and will be put on hold till new insurance starts, [facility] PAP is accepted, and when Clinical Pharmacy approves after next appt. in January

## 2024-12-04 NOTE — PROGRESS NOTES
Clinical Pharmacy Appointment    Patient ID: Edith Christian is a 69 y.o. female who presents for Diabetes.    Pt is here for First appointment.     Referring Provider: Hawa Hill MD M*      Subjective       HPI  DIABETES MELLITUS TYPE 2:    Diagnosed with diabetes:  NA. Known diabetic complications: NA.  Does patient follow with Endocrinology: No  Last optometry exam: 06/2024  Most recent visit in Podiatry: 06/2024-- patient denies sores or cuts on feet today      Current diabetic medications include:  Farxiga 10 mg daily  Metformin  mg 2 tabs bid    Clarifications to above regimen: NA  Adverse Effects:  pt reported had yeast infections years ago. No reoccurrences since then.     Past diabetic medications include:  NA    Glucose Readings:  Glucometer/CGM Type: glucometer  Patient tests BG 1 times per day in morning    Current home BG readings: 140-160s fasting   Previous home BG readings: NA    Any episodes of hypoglycemia? No,   .  Did patient treat episode of hypoglycemia appropriately? No,    Does the patient have a prescription for ready-to-use Glucagon? Not on insulin  Does pt have proteinuria? No    Lifestyle:  Diet: 3 meals/day. Cereal/fruit for breakfast. Balanced diet.   Physical Activity: [place] end of [place] but stopped since  diagnosed with cancer    Secondary Prevention:  Statin? Yes  ACE-I/ARB? Yes  Aspirin? No    Pertinent PMH Review:  PMH of Pancreatitis: No  PMH of Retinopathy: No  PMH of Urinary Tract Infections: Yes, resolved.   PMH of MTC: No, had breast cancer 22 years ago. Went in to remission 22 years ago.       Medication Reconciliation:  Changed:   Added:   Discontinued:     Drug Interactions  No relevant drug interactions were noted.      Objective   No Known Allergies  Social History     Social History Narrative    Not on file        Vitals  BP Readings from Last 2 Encounters:   11/25/24 142/80   11/22/23 137/77     BMI Readings from Last 1  Encounters:   11/25/24 25.35 kg/m²      Labs  A1C  Lab Results   Component Value Date    HGBA1C 7.5 (H) 11/25/2024    HGBA1C 7.6 (H) 05/14/2024    HGBA1C 6.9 (H) 11/17/2023     BMP  Lab Results   Component Value Date    CALCIUM 9.7 11/25/2024     11/25/2024    K 4.3 11/25/2024    CO2 24 11/25/2024     11/25/2024    BUN 20 11/25/2024    CREATININE 0.77 11/25/2024    EGFR 84 11/25/2024     LFTs  Lab Results   Component Value Date    ALT 14 11/25/2024    AST 16 11/25/2024    ALKPHOS 88 11/25/2024    BILITOT 0.7 11/25/2024     FLP  Lab Results   Component Value Date    TRIG 117 11/25/2024    CHOL 177 11/25/2024    LDLF 102 (H) 05/15/2023    LDLCALC 94 11/25/2024    HDL 59.9 11/25/2024     Urine Microalbumin  Lab Results   Component Value Date    MICROALBCREA  11/25/2024      Comment:      One or more analytes used in this calculation is outside of the analytical measurement range. Calculation cannot be performed.     Weight Management  Wt Readings from Last 3 Encounters:   11/25/24 68 kg (150 lb)   04/04/24 69.9 kg (154 lb)   11/22/23 71.7 kg (158 lb)      There is no height or weight on file to calculate BMI.     Assessment/Plan   Problem List Items Addressed This Visit       Type 2 diabetes mellitus without complication, without long-term current use of insulin (Multi)     Patient's goal A1c is < 7%.  Is pt at goal? No, 7.5  Patient's SMBGs are 140-160s.     Rationale for plan: pt will be getting new insurance near the end of December and confirmed that CGMs will be covered under her new insurance. Will send Dexcom G7 in advance, pt aware she can notify clinical pharmacy if insurance req. Vicki. Pt is also agrees GLP-1 initiation, plan is to start Ozempic .25 mg; however, current insurance has high copays ($250+). Talked with patient about  PAP and is highly interested, pt will send in income documents. Plan is to first enroll patient in  PAP to eliminate high copay costs, send in prescriptions for  Farxiga and Ozempic 0.25 mg (to be put on hold till new insurance starts and  PAP approved), send in Dexcom G7, and to monitor fasting BG till next appt. Pt agreed to better diet and to notify clinical pharmacy if drastic changes seen in BG.     Medication Changes:  CONTINUE  Farxiga 10 mg daily  Metformin  mg 2 tabs bid    Future Considerations:  Start Dexcom G7 when new insurance starts. Notify clinical pharmacy if insurance requires other brands  Continue taking home supply of Farxiga 10 mg. New prescription will be sent in but not filled till you get your new insurance and  PAP approved.   Ozempic 0.25 mg will be sent to our [facility] and will be put on hold till new insurance starts,  PAP is accepted, and when Clinical Pharmacy approves after next appt. in January         Relevant Orders    Referral to Clinical Pharmacy      Patient Assistance Program (PAP)    Application for program to be submitted for the following medications: Farxiga and Ozempic    [facility] Permanent Address: [facility]   Prescription Insurance:   Yes   Members of Household: 2   Files Taxes: No       Patient will be email financial information to pharmacist directly at [email or web address].    Patient verbally reports monthly or yearly income which is less than 400% federal poverty level    Patient aware this process may take up to 6 weeks.     If approved medication must be filled through [facility] and MEDICATION WILL BE MAILED TO PATIENT.      Clinical Pharmacist follow-up: 4 weeks 01/08/2025 10 am,    Continue all meds under the continuation of care with the referring provider and clinical pharmacy team.    Thank you,  Nay Garcia PharmD [facility]  Clinical Pharmacy Resident    Verbal consent to manage patient's drug therapy was obtained from the patient. They were informed they may decline to participate or withdraw from participation in pharmacy services at any time.

## 2024-12-17 DIAGNOSIS — I10 PRIMARY HYPERTENSION: ICD-10-CM

## 2024-12-17 RX ORDER — LOSARTAN POTASSIUM 25 MG/1
25 TABLET ORAL DAILY
Qty: 90 TABLET | Refills: 1 | Status: SHIPPED | OUTPATIENT
Start: 2024-12-17 | End: 2024-12-18 | Stop reason: SDUPTHER

## 2024-12-18 DIAGNOSIS — I10 PRIMARY HYPERTENSION: ICD-10-CM

## 2024-12-18 DIAGNOSIS — Z00.00 ROUTINE GENERAL MEDICAL EXAMINATION AT A HEALTH CARE FACILITY: ICD-10-CM

## 2024-12-18 RX ORDER — LOSARTAN POTASSIUM 25 MG/1
25 TABLET ORAL DAILY
Qty: 90 TABLET | Refills: 1 | Status: SHIPPED | OUTPATIENT
Start: 2024-12-18

## 2025-01-08 ENCOUNTER — APPOINTMENT (OUTPATIENT)
Dept: PHARMACY | Facility: HOSPITAL | Age: 70
End: 2025-01-08
Payer: MEDICARE

## 2025-01-08 DIAGNOSIS — E11.9 TYPE 2 DIABETES MELLITUS WITHOUT COMPLICATION, WITHOUT LONG-TERM CURRENT USE OF INSULIN (MULTI): ICD-10-CM

## 2025-01-08 NOTE — PROGRESS NOTES
Clinical Pharmacy Appointment    Patient ID: Edith Christian is a 69 y.o. female who presents for Diabetes.    Pt is here for Follow Up appointment.     Referring Provider: Hawa Hill MD M*      Subjective       HPI  DIABETES MELLITUS TYPE 2:    Diagnosed with diabetes:  NA. Known diabetic complications: NA.  Does patient follow with Endocrinology: No  Last optometry exam: 06/2024  Most recent visit in Podiatry: 06/2024-- patient denies sores or cuts on feet today      Current diabetic medications include:  Farxiga 10 mg daily  Metformin  mg 2 tabs bid    Clarifications to above regimen: NA  Adverse Effects:  pt reported had yeast infections years ago. No reoccurrences since then.     Past diabetic medications include:  NA    Glucose Readings:  Glucometer/CGM Type: glucometer  Patient tests BG 1 times per day in morning    Current home BG readings: 150-170s fasting (holidays)  Previous home BG readings: NA    Any episodes of hypoglycemia? No,   .  Did patient treat episode of hypoglycemia appropriately? No,    Does the patient have a prescription for ready-to-use Glucagon? Not on insulin  Does pt have proteinuria? No    Lifestyle:  Diet: 3 meals/day. Cereal/fruit for breakfast. Balanced diet.   Physical Activity: [place] till end of [place] but stopped since  diagnosed with cancer    Secondary Prevention:  Statin? Yes  ACE-I/ARB? Yes  Aspirin? No    Pertinent PMH Review:  PMH of Pancreatitis: No  PMH of Retinopathy: No  PMH of Urinary Tract Infections: Yes, resolved.   PMH of MTC: No, had breast cancer 22 years ago. Went in to remission 22 years ago.       Medication Reconciliation:  Changed:   Added:   Discontinued:     Drug Interactions  No relevant drug interactions were noted.      Objective   No Known Allergies  Social History     Social History Narrative    Not on file        Vitals  BP Readings from Last 2 Encounters:   11/25/24 142/80   11/22/23 137/77     BMI Readings from  Last 1 Encounters:   11/25/24 25.35 kg/m²      Labs  A1C  Lab Results   Component Value Date    HGBA1C 7.5 (H) 11/25/2024    HGBA1C 7.6 (H) 05/14/2024    HGBA1C 6.9 (H) 11/17/2023     BMP  Lab Results   Component Value Date    CALCIUM 9.7 11/25/2024     11/25/2024    K 4.3 11/25/2024    CO2 24 11/25/2024     11/25/2024    BUN 20 11/25/2024    CREATININE 0.77 11/25/2024    EGFR 84 11/25/2024     LFTs  Lab Results   Component Value Date    ALT 14 11/25/2024    AST 16 11/25/2024    ALKPHOS 88 11/25/2024    BILITOT 0.7 11/25/2024     FLP  Lab Results   Component Value Date    TRIG 117 11/25/2024    CHOL 177 11/25/2024    LDLF 102 (H) 05/15/2023    LDLCALC 94 11/25/2024    HDL 59.9 11/25/2024     Urine Microalbumin  Lab Results   Component Value Date    MICROALBCREA  11/25/2024      Comment:      One or more analytes used in this calculation is outside of the analytical measurement range. Calculation cannot be performed.     Weight Management  Wt Readings from Last 3 Encounters:   11/25/24 68 kg (150 lb)   04/04/24 69.9 kg (154 lb)   11/22/23 71.7 kg (158 lb)      There is no height or weight on file to calculate BMI.     Assessment/Plan   Problem List Items Addressed This Visit       Type 2 diabetes mellitus without complication, without long-term current use of insulin (Multi)     Patient's goal A1c is < 7%.  Is pt at goal? No, 7.5  Patient's SMBGs are 150-170s.     Rationale for plan: pt reported higher fasting BG reading from last visit, explained it was due to holidays.  PAP is pending for Ozempic and Farxiga, will notify patient once its approved. Counseled on how to use ozempic once its delivered. Pt will notify [facility] to fill dexcom with her new insurance. Upon test claim, PA may be required. Pt aware to call clinical pharmacy if PA required to expedite process. Plan is to continue current regiment, start ozempic .25 mg weekly, and to monitor BG daily.     Medication Changes:  CONTINUE  Farxiga  10 mg daily  Metformin  mg 2 tabs bid  START  Ozempic .25 mg weekly    Future Considerations:  Pt expressed interest in decreasing pill burden. Made aware that Ozempic, coupled with positive life style change, we can start decreasing metformin if clinically indicated.            Relevant Orders    Referral to Clinical Pharmacy        Patient Assistance Program (PAP)    Application for program to be submitted for the following medications: Farxiga and Ozempic    County of Permanent Address: [facility]   Prescription Insurance:   Yes   Members of Household: 2   Files Taxes: No       Patient will be email financial information to pharmacist directly at [email or web address].    Patient verbally reports monthly or yearly income which is less than 400% federal poverty level    Patient aware this process may take up to 6 weeks.     If approved medication must be filled through [facility] and MEDICATION WILL BE MAILED TO PATIENT.      Clinical Pharmacist follow-up: 4 weeks 02/05/2025 9am,    Continue all meds under the continuation of care with the referring provider and clinical pharmacy team.    Thank you,  Nay Garcia PharmD [facility]  Clinical Pharmacy Resident    Verbal consent to manage patient's drug therapy was obtained from the patient. They were informed they may decline to participate or withdraw from participation in pharmacy services at any time.

## 2025-01-08 NOTE — ASSESSMENT & PLAN NOTE
Patient's goal A1c is < 7%.  Is pt at goal? No, 7.5  Patient's SMBGs are 150-170s.     Rationale for plan: pt reported higher fasting BG reading from last visit, explained it was due to holidays.  PAP is pending for Ozempic and Farxiga, will notify patient once its approved. Counseled on how to use ozempic once its delivered. Pt will notify [facility] to fill dexcom with her new insurance. Upon test claim, PA may be required. Pt aware to call clinical pharmacy if PA required to expedite process. Plan is to continue current regiment, start ozempic .25 mg weekly, and to monitor BG daily.     Medication Changes:  CONTINUE  Farxiga 10 mg daily  Metformin  mg 2 tabs bid  START  Ozempic .25 mg weekly    Future Considerations:  Pt expressed interest in decreasing pill burden. Made aware that Ozempic, coupled with positive life style change, we can start decreasing metformin if clinically indicated.

## 2025-01-09 PROCEDURE — RXMED WILLOW AMBULATORY MEDICATION CHARGE

## 2025-01-13 ENCOUNTER — PHARMACY VISIT (OUTPATIENT)
Dept: PHARMACY | Facility: CLINIC | Age: 70
End: 2025-01-13
Payer: COMMERCIAL

## 2025-01-16 DIAGNOSIS — E78.5 DYSLIPIDEMIA: ICD-10-CM

## 2025-01-16 RX ORDER — ATORVASTATIN CALCIUM 10 MG/1
10 TABLET, FILM COATED ORAL DAILY
Qty: 90 TABLET | Refills: 0 | Status: SHIPPED | OUTPATIENT
Start: 2025-01-16

## 2025-02-05 ENCOUNTER — APPOINTMENT (OUTPATIENT)
Dept: PHARMACY | Facility: HOSPITAL | Age: 70
End: 2025-02-05
Payer: MEDICARE

## 2025-02-05 DIAGNOSIS — E11.9 TYPE 2 DIABETES MELLITUS WITHOUT COMPLICATION, WITHOUT LONG-TERM CURRENT USE OF INSULIN (MULTI): ICD-10-CM

## 2025-02-05 NOTE — PROGRESS NOTES
Clinical Pharmacy Appointment    Patient ID: Edith Christian is a 69 y.o. female who presents for Diabetes.    Pt is here for Follow Up appointment.     Referring Provider: Hawa Hill MD M*      Subjective       HPI  DIABETES MELLITUS TYPE 2:    Diagnosed with diabetes:  NA. Known diabetic complications: NA.  Does patient follow with Endocrinology: No  Last optometry exam: 06/2024  Most recent visit in Podiatry: 06/2024-- patient denies sores or cuts on feet today      Current diabetic medications include:  Farxiga 10 mg daily  Metformin  mg 2 tabs bid  Ozempic 0.25mg weekly    Clarifications to above regimen: pt hasn't started ozempic yet due to  in hospital.   Adverse Effects:  pt reported had yeast infections years ago. No reoccurrences since then.     Past diabetic medications include:  NA    Glucose Readings:  Glucometer/CGM Type: glucometer  Patient tests BG 1 times per day in morning    Current home BG readings: 161, 141  Previous home BG readings: NA    Any episodes of hypoglycemia? No,   .  Did patient treat episode of hypoglycemia appropriately? No,    Does the patient have a prescription for ready-to-use Glucagon? Not on insulin  Does pt have proteinuria? No    Lifestyle:  Diet: 3 meals/day. Cereal/fruit for breakfast. Balanced diet.   [facility] [place]: [place] [facility] of [place] but stopped since  diagnosed with cancer    Secondary Prevention:  Statin? Yes  ACE-I/ARB? Yes  Aspirin? No    Pertinent PMH Review:  PMH of Pancreatitis: No  PMH of Retinopathy: No  PMH of Urinary Tract Infections: Yes, resolved.   PMH of MTC: No, had breast cancer 22 years ago. Went in to remission 22 years ago.       Medication Reconciliation:  Changed:   Added:   Discontinued:     Drug Interactions  No relevant drug interactions were noted.      Objective   No Known Allergies  Social History     Social History Narrative    Not on file        Vitals  BP Readings from Last 2  Encounters:   11/25/24 142/80   11/22/23 137/77     BMI Readings from Last 1 Encounters:   11/25/24 25.35 kg/m²      Labs  A1C  Lab Results   Component Value Date    HGBA1C 7.5 (H) 11/25/2024    HGBA1C 7.6 (H) 05/14/2024    HGBA1C 6.9 (H) 11/17/2023     BMP  Lab Results   Component Value Date    CALCIUM 9.7 11/25/2024     11/25/2024    K 4.3 11/25/2024    CO2 24 11/25/2024     11/25/2024    BUN 20 11/25/2024    CREATININE 0.77 11/25/2024    EGFR 84 11/25/2024     LFTs  Lab Results   Component Value Date    ALT 14 11/25/2024    AST 16 11/25/2024    ALKPHOS 88 11/25/2024    BILITOT 0.7 11/25/2024     FLP  Lab Results   Component Value Date    TRIG 117 11/25/2024    CHOL 177 11/25/2024    LDLF 102 (H) 05/15/2023    LDLCALC 94 11/25/2024    HDL 59.9 11/25/2024     Urine Microalbumin  Lab Results   Component Value Date    MICROALBCREA  11/25/2024      Comment:      One or more analytes used in this calculation is outside of the analytical measurement range. Calculation cannot be performed.     Weight Management  Wt Readings from Last 3 Encounters:   11/25/24 68 kg (150 lb)   04/04/24 69.9 kg (154 lb)   11/22/23 71.7 kg (158 lb)      There is no height or weight on file to calculate BMI.     Assessment/Plan   Problem List Items Addressed This Visit       Type 2 diabetes mellitus without complication, without long-term current use of insulin (Multi)     Patient's goal A1c is < 7%.  Is pt at goal? No, 7.5  Patient's SMBGs are 141 and 161.     Rationale for plan: pt has not started ozempic and has not tested BG due to  being in ICU. Pt will start ozempic later this week, counseled on administration. Continue current therapy, will order for new A1c and BMP after next visit. .    Medication Changes:  CONTINUE  Farxiga 10 mg daily  Metformin  mg 2 tabs bid  Ozempic 0.25mg weekly                 Patient Assistance Program (PAP)    Application for program to be submitted for the following medications:  [facility] Permanent Address: [facility]   Prescription Insurance:   Yes   Members of Household: 2   Files Taxes: No       Patient will be email financial information to pharmacist directly at [email or web address].    Patient verbally reports monthly or yearly income which is less than 400% federal poverty level    Patient aware this process may take up to 6 weeks.     If approved medication must be filled through [facility] and MEDICATION WILL BE MAILED TO PATIENT.      Clinical Pharmacist follow-up: 4 weeks 03/05/2025 9am,    Continue all meds under the continuation of care with the referring provider and clinical pharmacy team.    Thank you,  Nay Garcia PharmD [facility]  Clinical Pharmacy Resident    Verbal consent to manage patient's drug therapy was obtained from the patient. They were informed they may decline to participate or withdraw from participation in pharmacy services at any time.

## 2025-02-05 NOTE — ASSESSMENT & PLAN NOTE
Patient's goal A1c is < 7%.  Is pt at goal? No, 7.5  Patient's SMBGs are 141 and 161.     Rationale for plan: pt has not started ozempic and has not tested BG due to  being in ICU. Pt will start ozempic later this week, counseled on administration. Continue current therapy, will order for new A1c and BMP after next visit. .    Medication Changes:  CONTINUE  Farxiga 10 mg daily  Metformin  mg 2 tabs bid  Ozempic 0.25mg weekly

## 2025-02-24 DIAGNOSIS — E11.9 TYPE 2 DIABETES MELLITUS WITHOUT COMPLICATION, WITHOUT LONG-TERM CURRENT USE OF INSULIN (MULTI): ICD-10-CM

## 2025-02-25 RX ORDER — METFORMIN HYDROCHLORIDE 500 MG/1
1000 TABLET, EXTENDED RELEASE ORAL 2 TIMES DAILY
Qty: 360 TABLET | Refills: 1 | Status: SHIPPED | OUTPATIENT
Start: 2025-02-25

## 2025-03-05 ENCOUNTER — APPOINTMENT (OUTPATIENT)
Dept: PHARMACY | Facility: HOSPITAL | Age: 70
End: 2025-03-05
Payer: MEDICARE

## 2025-03-05 DIAGNOSIS — E11.9 TYPE 2 DIABETES MELLITUS WITHOUT COMPLICATION, WITHOUT LONG-TERM CURRENT USE OF INSULIN (MULTI): ICD-10-CM

## 2025-03-05 NOTE — PROGRESS NOTES
Clinical Pharmacy Appointment    Patient ID: Edith Christian is a 69 y.o. female who presents for Diabetes.    Pt is here for Follow Up appointment.     Referring Provider: Hawa Hill MD M*      Subjective       HPI  DIABETES MELLITUS TYPE 2:    Diagnosed with diabetes:  NA. Known diabetic complications: NA.  Does patient follow with Endocrinology: No  Last optometry exam: 06/2024  Most recent visit in Podiatry: 06/2024-- patient denies sores or cuts on feet today      Current diabetic medications include:  Farxiga 10 mg daily  Metformin  mg 2 tabs bid  Ozempic 0.25mg weekly    Clarifications to above regimen: NA  Adverse Effects: has nausea. Has become less severe. Lost 10 lbs.     Past diabetic medications include:  NA    Glucose Readings:  Glucometer/CGM Type: glucometer  Patient tests BG 1 times per day in morning    Current home BG readings: 125,121,152,140,129,152,128,139,130,150,155,138,137,142,140,132 - avg 138  Previous home BG readings: NA    Weight: 150 --> 141 lbs    Any episodes of hypoglycemia? No,   .  Did patient treat episode of hypoglycemia appropriately? No,    Does the patient have a prescription for ready-to-use Glucagon? Not on insulin  Does pt have proteinuria? No    Lifestyle:  Diet: 3 meals/day. Cereal/fruit for breakfast. Balanced diet.   Physical Activity: [place] end of [place] but stopped since  diagnosed with cancer    Secondary Prevention:  Statin? Yes  ACE-I/ARB? Yes  Aspirin? No    Pertinent PMH Review:  PMH of Pancreatitis: No  PMH of Retinopathy: No  PMH of Urinary Tract Infections: Yes, resolved.   PMH of MTC: No, had breast cancer 22 years ago. Went in to remission 22 years ago.       Medication Reconciliation:  Changed:   Added:   Discontinued:     Drug Interactions  No relevant drug interactions were noted.      Objective   No Known Allergies  Social History     Social History Narrative    Not on file        Vitals  BP Readings from Last  2 Encounters:   11/25/24 142/80   11/22/23 137/77     BMI Readings from Last 1 Encounters:   11/25/24 25.35 kg/m²      Labs  A1C  Lab Results   Component Value Date    HGBA1C 7.5 (H) 11/25/2024    HGBA1C 7.6 (H) 05/14/2024    HGBA1C 6.9 (H) 11/17/2023     BMP  Lab Results   Component Value Date    CALCIUM 9.7 11/25/2024     11/25/2024    K 4.3 11/25/2024    CO2 24 11/25/2024     11/25/2024    BUN 20 11/25/2024    CREATININE 0.77 11/25/2024    EGFR 84 11/25/2024     LFTs  Lab Results   Component Value Date    ALT 14 11/25/2024    AST 16 11/25/2024    ALKPHOS 88 11/25/2024    BILITOT 0.7 11/25/2024     FLP  Lab Results   Component Value Date    TRIG 117 11/25/2024    CHOL 177 11/25/2024    LDLF 102 (H) 05/15/2023    LDLCALC 94 11/25/2024    HDL 59.9 11/25/2024     Urine Microalbumin  Lab Results   Component Value Date    MICROALBCREA  11/25/2024      Comment:      One or more analytes used in this calculation is outside of the analytical measurement range. Calculation cannot be performed.     Weight Management  Wt Readings from Last 3 Encounters:   11/25/24 68 kg (150 lb)   04/04/24 69.9 kg (154 lb)   11/22/23 71.7 kg (158 lb)      There is no height or weight on file to calculate BMI.     Assessment/Plan   Problem List Items Addressed This Visit       Type 2 diabetes mellitus without complication, without long-term current use of insulin (Multi)     Patient's goal A1c is < 7%.  Is pt at goal? No, 7.5  Patient's SMBGs are avg of 138.     Rationale for plan: pt reported improved BG levels from last visit. Complains of nausea with use of ozempic, but has gotten less severe since first dose. Pt reports losing 10lbs as well. Pt agreed to continue current regimen for next month and possibly increase to 0.5mg strength. A1c and BMP ordered.     Medication Changes:  CONTINUE  Farxiga 10 mg daily  Metformin  mg 2 tabs bid  Ozempic 0.25mg weekly         Relevant Medications    semaglutide 0.25 mg or 0.5 mg (2  mg/3 mL) pen injector    Other Relevant Orders    Hemoglobin A1c    Basic metabolic panel    Referral to Clinical Pharmacy            Patient Assistance Program (PAP)    Application for program to be submitted for the following medications: Farxiga and Ozempic    [facility] Permanent Address: [facility]   Prescription Insurance:   Yes   Members of Household: 2   Files Taxes: No       Patient will be email financial information to pharmacist directly at [email or web address].    Patient verbally reports monthly or yearly income which is less than 400% federal poverty level    Patient aware this process may take up to 6 weeks.     If approved medication must be filled through [facility] and MEDICATION WILL BE MAILED TO PATIENT.      Clinical Pharmacist follow-up: 4 weeks 04/09/2025 9am,    Continue all meds under the continuation of care with the referring provider and clinical pharmacy team.    Thank you,  Nay Garcia PharmD [facility]  Clinical Pharmacy Resident    Verbal consent to manage patient's drug therapy was obtained from the patient. They were informed they may decline to participate or withdraw from participation in pharmacy services at any time.

## 2025-03-05 NOTE — ASSESSMENT & PLAN NOTE
Patient's goal A1c is < 7%.  Is pt at goal? No, 7.5  Patient's SMBGs are avg of 138.     Rationale for plan: pt reported improved BG levels from last visit. Complains of nausea with use of ozempic, but has gotten less severe since first dose. Pt reports losing 10lbs as well. Pt agreed to continue current regimen for next month and possibly increase to 0.5mg strength. A1c and BMP ordered.     Medication Changes:  CONTINUE  Farxiga 10 mg daily  Metformin  mg 2 tabs bid  Ozempic 0.25mg weekly

## 2025-03-13 ENCOUNTER — APPOINTMENT (OUTPATIENT)
Dept: PRIMARY CARE | Facility: CLINIC | Age: 70
End: 2025-03-13
Payer: MEDICARE

## 2025-03-13 VITALS
DIASTOLIC BLOOD PRESSURE: 70 MMHG | WEIGHT: 154 LBS | HEART RATE: 86 BPM | BODY MASS INDEX: 25.66 KG/M2 | HEIGHT: 65 IN | OXYGEN SATURATION: 96 % | SYSTOLIC BLOOD PRESSURE: 123 MMHG

## 2025-03-13 DIAGNOSIS — E11.9 TYPE 2 DIABETES MELLITUS WITHOUT COMPLICATION, WITHOUT LONG-TERM CURRENT USE OF INSULIN: ICD-10-CM

## 2025-03-13 PROCEDURE — 3074F SYST BP LT 130 MM HG: CPT | Performed by: STUDENT IN AN ORGANIZED HEALTH CARE EDUCATION/TRAINING PROGRAM

## 2025-03-13 PROCEDURE — 99213 OFFICE O/P EST LOW 20 MIN: CPT | Performed by: STUDENT IN AN ORGANIZED HEALTH CARE EDUCATION/TRAINING PROGRAM

## 2025-03-13 PROCEDURE — 1159F MED LIST DOCD IN RCRD: CPT | Performed by: STUDENT IN AN ORGANIZED HEALTH CARE EDUCATION/TRAINING PROGRAM

## 2025-03-13 PROCEDURE — 1126F AMNT PAIN NOTED NONE PRSNT: CPT | Performed by: STUDENT IN AN ORGANIZED HEALTH CARE EDUCATION/TRAINING PROGRAM

## 2025-03-13 PROCEDURE — 3008F BODY MASS INDEX DOCD: CPT | Performed by: STUDENT IN AN ORGANIZED HEALTH CARE EDUCATION/TRAINING PROGRAM

## 2025-03-13 PROCEDURE — 3078F DIAST BP <80 MM HG: CPT | Performed by: STUDENT IN AN ORGANIZED HEALTH CARE EDUCATION/TRAINING PROGRAM

## 2025-03-13 PROCEDURE — G2211 COMPLEX E/M VISIT ADD ON: HCPCS | Performed by: STUDENT IN AN ORGANIZED HEALTH CARE EDUCATION/TRAINING PROGRAM

## 2025-03-13 PROCEDURE — 4010F ACE/ARB THERAPY RXD/TAKEN: CPT | Performed by: STUDENT IN AN ORGANIZED HEALTH CARE EDUCATION/TRAINING PROGRAM

## 2025-03-13 RX ORDER — BLOOD-GLUCOSE,RECEIVER,CONT
EACH MISCELLANEOUS
Qty: 1 EACH | Refills: 2 | Status: SHIPPED | OUTPATIENT
Start: 2025-03-13

## 2025-03-13 RX ORDER — BLOOD-GLUCOSE SENSOR
EACH MISCELLANEOUS
Qty: 2 EACH | Refills: 3 | Status: SHIPPED | OUTPATIENT
Start: 2025-03-13

## 2025-03-13 ASSESSMENT — ENCOUNTER SYMPTOMS
TREMORS: 0
BLURRED VISION: 0
OCCASIONAL FEELINGS OF UNSTEADINESS: 0
POLYDIPSIA: 1
HEADACHES: 0
HUNGER: 0
BLACKOUTS: 0
WEIGHT LOSS: 0
LOSS OF SENSATION IN FEET: 0
SEIZURES: 0
POLYPHAGIA: 0
FATIGUE: 0
SWEATS: 0
VISUAL CHANGE: 0
CONFUSION: 0
NERVOUS/ANXIOUS: 0
DEPRESSION: 0
WEAKNESS: 0
DIZZINESS: 0
SPEECH DIFFICULTY: 0

## 2025-03-13 ASSESSMENT — PAIN SCALES - GENERAL: PAINLEVEL_OUTOF10: 0-NO PAIN

## 2025-03-13 NOTE — PROGRESS NOTES
"Subjective   Patient ID: Edith Christian is a 69 y.o. female with a hx of T2DM, HTN, HLD  presents for Follow-up    Concerns today:  #Diabetes management  The patient recently started Ozempic for diabetes management. Initially, she experienced significant nausea, likened to morning sickness, which has partially subsided. The patient is currently on a 0.25 mg dose and has been advised by her pharmacist to maintain this dose for another month before increasing to 0.5 mg.    Prior to Ozempic, the patient was using metformin and Farxiga without significant issues, except for occasional yeast infections associated with Farxiga use.    #Recurrent yeast infections  The patient has a prescription for fluconazole to manage yeast infections. Since initiating Ozempic, the patient developed another yeast infection approximately 2-3 weeks ago, which was treated with fluconazole. She now reports symptoms suggestive of a recurrent yeast infection.    #Caregiver stress  The patient's  is undergoing treatment for glioblastoma. He recently completed 6 weeks of radiation and chemotherapy following tumor resection. He has experienced seizures, resulting in two hospitalizations, including a 2-week ICU stay. He is currently on dexamethasone, with the dose recently reduced from 16 mg to 14 mg due to concerns about seizures related to swelling in the incision area. The patient is managing her 's care and appointments, with some assistance from her daughter-in-law.        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  No alcohol use no smoking, no other drug use  Live at home with        Objective   /70 (BP Location: Left arm, Patient Position: Sitting, BP Cuff Size: Adult)   Pulse 86   Ht 1.651 m (5' 5\")   Wt 69.9 kg (154 lb)   SpO2 96%   BMI 25.63 kg/m² "     General: Well appearing, conversational, in no acute distress  HEENT: EOMI, PERRL, nares patent without congestion, MMM  CV: RRR, no murmurs  Resp: Lungs CTAB, normal work of breathing  GI: Soft, nondistended, nontender, BS+   Ext: No lower ext swelling  Skin: Warm, dry, no rashes  Neuro: Awake, alert, oriented x3, moving all 4 extremities, nonfocal, normal gait, ambulates without assistance  Psych: Appropriate mood and affect       Assessment/Plan   Edith Christian is a 69 y.o. female with a hx of T2DM, HTN, HLD  presents for Follow-up.    Doing well overall  Having some nausea with ozempic, so will continue 0.25mg dose for another month to see if this resolved before going up  Will get blood work today     Health Maintenance:  1- Due for colonoscopy in may 2027  2- HPV testing no longer indicated  4- Dexa scan due in 2-3 years, osteopenia on Dexa scan 2024  5- mammo done in March 2024, due April 2025  6- Tdap 2016 > 2026   7- shingrix completed in 2022  9- Due for Prevnar 20

## 2025-03-13 NOTE — PATIENT INSTRUCTIONS
Thank you for coming today  Edith!    Please get your blood work. You can get this at any  lab, the one here is on the lower level in [address].     Send me a message if you keep having issues with yeast infections or nausea, so we can adjust the medications.    I will be at main campus. If you end up staying with [address], Dr. Villagomez and Dr. Katz are taking patients in our office. Ag[facility] is starting in May. You can call [phone] to schedule with any of them. There is a new doctor in [address] starting in July. The phone number there is [phone].

## 2025-03-14 ENCOUNTER — DOCUMENTATION (OUTPATIENT)
Dept: PRIMARY CARE | Facility: CLINIC | Age: 70
End: 2025-03-14
Payer: MEDICARE

## 2025-03-15 LAB
ANION GAP SERPL CALCULATED.4IONS-SCNC: 14 MMOL/L (CALC) (ref 7–17)
BUN SERPL-MCNC: 19 MG/DL (ref 7–25)
BUN/CREAT SERPL: ABNORMAL (CALC) (ref 6–22)
CALCIUM SERPL-MCNC: 9.7 MG/DL (ref 8.6–10.4)
CHLORIDE SERPL-SCNC: 105 MMOL/L (ref 98–110)
CO2 SERPL-SCNC: 23 MMOL/L (ref 20–32)
CREAT SERPL-MCNC: 0.79 MG/DL (ref 0.5–1.05)
EGFRCR SERPLBLD CKD-EPI 2021: 81 ML/MIN/1.73M2
EST. AVERAGE GLUCOSE BLD GHB EST-MCNC: 154 MG/DL
EST. AVERAGE GLUCOSE BLD GHB EST-SCNC: 8.5 MMOL/L
GLUCOSE SERPL-MCNC: 109 MG/DL (ref 65–99)
HBA1C MFR BLD: 7 % OF TOTAL HGB
POTASSIUM SERPL-SCNC: 4.4 MMOL/L (ref 3.5–5.3)
SODIUM SERPL-SCNC: 142 MMOL/L (ref 135–146)

## 2025-04-09 ENCOUNTER — APPOINTMENT (OUTPATIENT)
Dept: PHARMACY | Facility: HOSPITAL | Age: 70
End: 2025-04-09
Payer: MEDICARE

## 2025-04-09 DIAGNOSIS — E11.9 TYPE 2 DIABETES MELLITUS WITHOUT COMPLICATION, WITHOUT LONG-TERM CURRENT USE OF INSULIN: ICD-10-CM

## 2025-04-09 NOTE — PROGRESS NOTES
Clinical Pharmacy Appointment    Patient ID: Edith Christian is a 69 y.o. female who presents for Diabetes.    Pt is here for Follow Up appointment.     Referring Provider: Hawa Hill MD M*    FARXIGA AND OZEMPIC  PAP APPROVED TILL 01/09/2026    Subjective       HPI  DIABETES MELLITUS TYPE 2:    Diagnosed with diabetes:  NA. Known diabetic complications: NA.  Does patient follow with Endocrinology: No  Last optometry exam: 06/2024  Most recent visit in Podiatry: 06/2024-- patient denies sores or cuts on feet today      Current diabetic medications include:  Farxiga 10 mg daily  Metformin  mg 2 tabs bid  Ozempic 0.25mg weekly    Clarifications to above regimen: NA  Adverse Effects: yeast infections on farxiga.     Past diabetic medications include:  NA    Glucose Readings:  Glucometer/CGM Type: glucometer  Patient tests BG 1 times per day in morning    Current home BG readings: 126,128,130,118,137,118,139,102,126,111,100 - avg 111  Previous home BG readings: 125,121,152,140,129,152,128,139,130,150,155,138,137,142,140,132 - avg 138    Weight: 150 --> 141 lbs    Any episodes of hypoglycemia? No,   .  Did patient treat episode of hypoglycemia appropriately? No,    Does the patient have a prescription for ready-to-use Glucagon? Not on insulin  Does pt have proteinuria? No    Lifestyle:  Diet: 3 meals/day. Cereal/fruit for breakfast. Balanced diet.   Physical [place]: [place] end of [place] but stopped since  diagnosed with cancer    Secondary Prevention:  Statin? Yes  ACE-I/ARB? Yes  Aspirin? No    Pertinent PMH Review:  PMH of Pancreatitis: No  PMH of Retinopathy: No  PMH of Urinary Tract Infections: Yes, resolved.   PMH of MTC: No, had breast cancer 22 years ago. Went in to remission 22 years ago.       Medication Reconciliation:  Changed:   Added:   Discontinued:     Drug Interactions  No relevant drug interactions were noted.      Objective   No Known Allergies  Social  History     Social History Narrative    Not on file        Vitals  BP Readings from Last 2 Encounters:   03/13/25 123/70   11/25/24 142/80     BMI Readings from Last 1 Encounters:   03/13/25 25.63 kg/m²      Labs  A1C  Lab Results   Component Value Date    HGBA1C 7.0 (H) 03/13/2025    HGBA1C 7.5 (H) 11/25/2024    HGBA1C 7.6 (H) 05/14/2024     BMP  Lab Results   Component Value Date    CALCIUM 9.7 03/13/2025     03/13/2025    K 4.4 03/13/2025    CO2 23 03/13/2025     03/13/2025    BUN 19 03/13/2025    CREATININE 0.79 03/13/2025    EGFR 81 03/13/2025     LFTs  Lab Results   Component Value Date    ALT 14 11/25/2024    AST 16 11/25/2024    ALKPHOS 88 11/25/2024    BILITOT 0.7 11/25/2024     FLP  Lab Results   Component Value Date    TRIG 117 11/25/2024    CHOL 177 11/25/2024    LDLF 102 (H) 05/15/2023    LDLCALC 94 11/25/2024    HDL 59.9 11/25/2024     Urine Microalbumin  Lab Results   Component Value Date    MICROALBCREA  11/25/2024      Comment:      One or more analytes used in this calculation is outside of the analytical measurement range. Calculation cannot be performed.     Weight Management  Wt Readings from Last 3 Encounters:   03/13/25 69.9 kg (154 lb)   11/25/24 68 kg (150 lb)   04/04/24 69.9 kg (154 lb)      There is no height or weight on file to calculate BMI.     Assessment/Plan   Problem List Items Addressed This Visit       Type 2 diabetes mellitus without complication, without long-term current use of insulin     Patient's goal A1c is < 7%.  Is pt at goal? Yes, 7  Patient's SMBGs are 126,128,130,118,137,118,139,102,126,111,100 - avg 111.     Rationale for plan: pt reported improved BG compared to last visit. Pt reported multiple yeast infections in past month, will plan to stop farxiga. Will continue with ozempic and metformin. Pts nausea recently resolved. If pt sees increase in BG, resolve with strict diet/exercise. If BG consistently >150 pt aware to call clinical pharmacy and will  increase to ozempic 0.5mg weekly    Medication Changes:  CONTINUE  Metformin  mg 2 tabs bid  Ozempic 0.25mg weekly  STOP  Farxiga 10 mg daily                    Clinical Pharmacist follow-up: 4 weeks 05/07/2025 10 am,    Continue all meds under the continuation of care with the referring provider and clinical pharmacy team.    Thank you,  Nay Garcia PharmD [facility]  Clinical Pharmacy Resident    Verbal consent to manage patient's drug therapy was obtained from the patient. They were informed they may decline to participate or withdraw from participation in pharmacy services at any time.

## 2025-04-09 NOTE — ASSESSMENT & PLAN NOTE
Patient's goal A1c is < 7%.  Is pt at goal? Yes, 7  Patient's SMBGs are 126,128,130,118,137,118,139,102,126,111,100 - avg 111.     Rationale for plan: pt reported improved BG compared to last visit. Pt reported multiple yeast infections in past month, will plan to stop farxiga. Will continue with ozempic and metformin. Pts nausea recently resolved. If pt sees increase in BG, resolve with strict diet/exercise. If BG consistently >150 pt aware to call clinical pharmacy and will increase to ozempic 0.5mg weekly    Medication Changes:  CONTINUE  Metformin  mg 2 tabs bid  Ozempic 0.25mg weekly  STOP  Farxiga 10 mg daily

## 2025-04-21 DIAGNOSIS — E78.5 DYSLIPIDEMIA: ICD-10-CM

## 2025-04-21 RX ORDER — ATORVASTATIN CALCIUM 10 MG/1
10 TABLET, FILM COATED ORAL DAILY
Qty: 90 TABLET | Refills: 3 | Status: SHIPPED | OUTPATIENT
Start: 2025-04-21

## 2025-05-02 ENCOUNTER — HOSPITAL ENCOUNTER (OUTPATIENT)
Dept: RADIOLOGY | Facility: HOSPITAL | Age: 70
Discharge: HOME | End: 2025-05-02
Payer: MEDICARE

## 2025-05-02 DIAGNOSIS — Z12.31 BREAST CANCER SCREENING BY MAMMOGRAM: ICD-10-CM

## 2025-05-02 PROCEDURE — 77067 SCR MAMMO BI INCL CAD: CPT

## 2025-05-07 ENCOUNTER — APPOINTMENT (OUTPATIENT)
Dept: PHARMACY | Facility: HOSPITAL | Age: 70
End: 2025-05-07
Payer: MEDICARE

## 2025-05-07 DIAGNOSIS — E11.9 TYPE 2 DIABETES MELLITUS WITHOUT COMPLICATION, WITHOUT LONG-TERM CURRENT USE OF INSULIN: ICD-10-CM

## 2025-05-07 NOTE — ASSESSMENT & PLAN NOTE
Patient's goal A1c is < 7%.  Is pt at goal? No, 7  Patient's SMBGs are 118,133,118,107,144,128,132,134,118,118,136,121,130,123,124,110,138,130,122,128,127 - avg 125.     Rationale for plan: pt tolerated ozempic well with no ALYCE. Pt avg fasting BG has increased compared to last visit but still is controlled. Will plan to decrease metformin to 500mg BID and continue ozempic 0.25. Pt requested to continue current dose to prevent further weight loss (currently 141 lbs). Pt will attempt to control BG with strict diet. Lipid panel and goal HDL discussed. Pt to complete lipid panel, A1c, and CMP 1 week prior to appt.     Medication Changes:  CONTINUE  Ozempic 0.25mg weekly  STOP  Metformin  mg 2 tabs bid  START  Metformin  mg bid    Future Considerations:  Will consider titration of metformin and ozempic if indicated.

## 2025-05-07 NOTE — PROGRESS NOTES
Clinical Pharmacy Appointment    Patient ID: Edith Christian is a 69 y.o. female who presents for Diabetes.    Pt is here for Follow Up appointment.     Referring Provider: Hawa Hill MD M*    FARXIGA AND OZEMPIC  PAP APPROVED TILL 01/09/2026    Subjective       HPI  DIABETES MELLITUS TYPE 2:    Diagnosed with diabetes:  NA. Known diabetic complications: NA.  Does patient follow with Endocrinology: No  Last optometry exam: 06/2024  Most recent visit in Podiatry: 06/2024-- patient denies sores or cuts on feet today      Current diabetic medications include:  Metformin  mg 2 tabs bid  Ozempic 0.25mg weekly    Clarifications to above regimen: NA  Adverse Effects: NA    Past diabetic medications include:  Farxiga    Glucose Readings:  Glucometer/CGM Type: glucometer  Patient tests BG 1 times per day in morning    Current home BG readings: 118,133,118,107,144,128,132,134,118,118,136,121,130,123,124,110,138,130,122,128,127 - avg 125  Previous home BG readings: 126,128,130,118,137,118,139,102,126,111,100 - avg 121    Weight: 150 --> 134 lbs      Any episodes of hypoglycemia? No,  .  Did patient treat episode of hypoglycemia appropriately? No,    Does the patient have a prescription for ready-to-use Glucagon? Not on insulin  Does pt have proteinuria? No    Lifestyle:  Diet: 3 meals/day. Cereal/fruit for breakfast. Balanced diet.   [facility] [place]: [place] [facility] of [place] but stopped since  diagnosed with cancer    Secondary Prevention:  Statin? Yes  ACE-I/ARB? Yes  Aspirin? No    Pertinent PMH Review:  PMH of Pancreatitis: No  PMH of Retinopathy: No  PMH of Urinary Tract Infections: Yes, resolved.   PMH of MTC: No, had breast cancer 22 years ago. Went in to remission 22 years ago.       Medication Reconciliation:  Changed:   Added:   Discontinued: Dexcompt     Drug Interactions  No relevant drug interactions were noted.      Objective   No Known Allergies  Social History     Social  History Narrative    Not on file        Vitals  BP Readings from Last 2 Encounters:   03/13/25 123/70   11/25/24 142/80     BMI Readings from Last 1 Encounters:   03/13/25 25.63 kg/m²      Labs  A1C  Lab Results   Component Value Date    HGBA1C 7.0 (H) 03/13/2025    HGBA1C 7.5 (H) 11/25/2024    HGBA1C 7.6 (H) 05/14/2024     BMP  Lab Results   Component Value Date    CALCIUM 9.7 03/13/2025     03/13/2025    K 4.4 03/13/2025    CO2 23 03/13/2025     03/13/2025    BUN 19 03/13/2025    CREATININE 0.79 03/13/2025    EGFR 81 03/13/2025     LFTs  Lab Results   Component Value Date    ALT 14 11/25/2024    AST 16 11/25/2024    ALKPHOS 88 11/25/2024    BILITOT 0.7 11/25/2024     FLP  Lab Results   Component Value Date    TRIG 117 11/25/2024    CHOL 177 11/25/2024    LDLF 102 (H) 05/15/2023    LDLCALC 94 11/25/2024    HDL 59.9 11/25/2024     Urine Microalbumin  Lab Results   Component Value Date    MICROALBCREA  11/25/2024      Comment:      One or more analytes used in this calculation is outside of the analytical measurement range. Calculation cannot be performed.     Weight Management  Wt Readings from Last 3 Encounters:   03/13/25 69.9 kg (154 lb)   11/25/24 68 kg (150 lb)   04/04/24 69.9 kg (154 lb)      There is no height or weight on file to calculate BMI.     Assessment/Plan   Problem List Items Addressed This Visit       Type 2 diabetes mellitus without complication, without long-term current use of insulin    Patient's goal A1c is < 7%.  Is pt at goal? No, 7  Patient's SMBGs are 118,133,118,107,144,128,132,134,118,118,136,121,130,123,124,110,138,130,122,128,127 - avg 125.     Rationale for plan: pt tolerated ozempic well with no ALYCE. Pt avg fasting BG has increased compared to last visit but still is controlled. Will plan to decrease metformin to 500mg BID and continue ozempic 0.25. Pt requested to continue current dose to prevent further weight loss (currently 141 lbs). Pt will attempt to control BG with  strict diet. Lipid panel and goal HDL discussed. Pt to complete lipid panel, A1c, and CMP 1 week prior to appt.     Medication Changes:  CONTINUE  Ozempic 0.25mg weekly  STOP  Metformin  mg 2 tabs bid  START  Metformin  mg bid    Future Considerations:  Will consider titration of metformin and ozempic if indicated.                       Clinical Pharmacist follow-up: 4 weeks 06/04/2025 10 am,    Continue all meds under the continuation of care with the referring provider and clinical pharmacy team.    Thank you,  Nay MelgarD [facility]  Clinical Pharmacy Resident    Verbal consent to manage patient's drug therapy was obtained from the patient. They were informed they may decline to participate or withdraw from participation in pharmacy services at any time.

## 2025-05-30 LAB
ALBUMIN SERPL-MCNC: 4.3 G/DL (ref 3.6–5.1)
ALP SERPL-CCNC: 65 U/L (ref 37–153)
ALT SERPL-CCNC: 11 U/L (ref 6–29)
ANION GAP SERPL CALCULATED.4IONS-SCNC: 10 MMOL/L (CALC) (ref 7–17)
AST SERPL-CCNC: 14 U/L (ref 10–35)
BILIRUB SERPL-MCNC: 0.8 MG/DL (ref 0.2–1.2)
BUN SERPL-MCNC: 21 MG/DL (ref 7–25)
CALCIUM SERPL-MCNC: 9.7 MG/DL (ref 8.6–10.4)
CHLORIDE SERPL-SCNC: 105 MMOL/L (ref 98–110)
CHOLEST SERPL-MCNC: 140 MG/DL
CHOLEST/HDLC SERPL: 2.5 (CALC)
CO2 SERPL-SCNC: 25 MMOL/L (ref 20–32)
CREAT SERPL-MCNC: 0.7 MG/DL (ref 0.5–1.05)
EGFRCR SERPLBLD CKD-EPI 2021: 94 ML/MIN/1.73M2
EST. AVERAGE GLUCOSE BLD GHB EST-MCNC: 148 MG/DL
EST. AVERAGE GLUCOSE BLD GHB EST-SCNC: 8.2 MMOL/L
GLUCOSE SERPL-MCNC: 107 MG/DL (ref 65–99)
HBA1C MFR BLD: 6.8 %
HDLC SERPL-MCNC: 57 MG/DL
LDLC SERPL CALC-MCNC: 62 MG/DL (CALC)
NONHDLC SERPL-MCNC: 83 MG/DL (CALC)
POTASSIUM SERPL-SCNC: 4.2 MMOL/L (ref 3.5–5.3)
PROT SERPL-MCNC: 6.5 G/DL (ref 6.1–8.1)
SODIUM SERPL-SCNC: 140 MMOL/L (ref 135–146)
TRIGL SERPL-MCNC: 130 MG/DL

## 2025-06-04 ENCOUNTER — APPOINTMENT (OUTPATIENT)
Dept: PHARMACY | Facility: HOSPITAL | Age: 70
End: 2025-06-04
Payer: MEDICARE

## 2025-06-04 DIAGNOSIS — E11.9 TYPE 2 DIABETES MELLITUS WITHOUT COMPLICATION, WITHOUT LONG-TERM CURRENT USE OF INSULIN: ICD-10-CM

## 2025-06-04 NOTE — ASSESSMENT & PLAN NOTE
Patient's goal A1c is < 7%.  Is pt at goal? Yes, 6.8  Patient's SMBGs are 134,142,146,129,128,137,134,143,144,122,143,150,150,152- avg- 139.     Rationale for plan: pt reported  increase in BG compared to last month (had been forgetting to take metformin doses due to taking care of ). Pt tolerated ozempic well no with no ALYCE. Advised pt to incorporate protein within diet to preserve muscle mass.  Pt can stop metformin as she has already been skipping doses. Due to uncontrolled BG and tolerating ozempic will plan to increase ozempic to 0.5mg weekly.  Pt expressed stopping statin due to controlled LDL. Discussed 30%+ decrease added by atorvastatin 10mg, pt believes she can control and maintain LDL <70 with diet and weight loss. Advised pt to complete Lipid panel prior to next appt and if LDL is >70, will have to restart. Discussed importance of controlled LDL, ASCVD Risk, MI/Stroke Risk. Pt also reported stopping losartan as home BP is consistently less than 120/80.     Medication Changes:  STOP  Metformin  mg 1 tabs bid  Ozempic 0.25mg weekly  START  Ozempic 0.5 mg weekly    Future Considerations:  Titrate ozempic q2-3 months as pt is hyperreactive  Pt will stop atorvastatin. Agreed to complete LDL prior to next appt. Plan to restart if >70

## 2025-06-04 NOTE — PROGRESS NOTES
Clinical Pharmacy Appointment    Patient ID: Edith Christian is a 69 y.o. female who presents for Diabetes.    Pt is here for Follow Up appointment.     Referring Provider: Hawa Hill MD M*    JOVANNYXIGA AND OZEMPIC  PAP APPROVED TILL 01/09/2026    Subjective       HPI  DIABETES MELLITUS TYPE 2:    Diagnosed with diabetes:  NA. Known diabetic complications: NA.  Does patient follow with Endocrinology: No  Last optometry exam: 06/2024  Most recent visit in Podiatry: 06/2024-- patient denies sores or cuts on feet today      Current diabetic medications include:  Metformin  mg 1 tabs bid  Ozempic 0.25mg weekly    Clarifications to above regimen: has been skipping doses of metformin due to taking care of .   Adverse Effects: NA    Past diabetic medications include:  Farxiga    Glucose Readings:  Glucometer/CGM Type: glucometer  Patient tests BG 1 times per day in morning    Current home BG readings: 134,142,146,129,128,137,134,143,144,122,143,150,150,152- avg- 139  Previous home BG readings: 118,133,118,107,144,128,132,134,118,118,136,121,130,123,124,110,138,130,122,128,127 - avg 125    Weight: 150 --> 127.8 lbs      Any episodes of hypoglycemia? No,  .  Did patient treat episode of hypoglycemia appropriately? No,    Does the patient have a prescription for ready-to-use Glucagon? Not on insulin  Does pt have proteinuria? No    Lifestyle:  Diet: 3 meals/day. Cereal/fruit for breakfast. Balanced diet. Reports stopped eating fast food and cooks meals at home.   [place]: [place] [facility] end of [place] but stopped since  diagnosed with cancer    Secondary Prevention:  Statin? Yes  ACE-I/ARB? Yes  Aspirin? No    Pertinent PMH Review:  PMH of Pancreatitis: No  PMH of Retinopathy: No  PMH of Urinary Tract Infections: Yes, resolved.   PMH of MTC: No, had breast cancer 22 years ago. Went in to remission 22 years ago.       Medication Reconciliation:  Changed:   Added:    Discontinued: Dexcompt     Drug Interactions  No relevant drug interactions were noted.      Objective   No Known Allergies  Social History     Social History Narrative    Not on file        Vitals  BP Readings from Last 2 Encounters:   03/13/25 123/70   11/25/24 142/80     BMI Readings from Last 1 Encounters:   03/13/25 25.63 kg/m²      Labs  A1C  Lab Results   Component Value Date    HGBA1C 6.8 (H) 05/29/2025    HGBA1C 7.0 (H) 03/13/2025    HGBA1C 7.5 (H) 11/25/2024     BMP  Lab Results   Component Value Date    CALCIUM 9.7 05/29/2025     05/29/2025    K 4.2 05/29/2025    CO2 25 05/29/2025     05/29/2025    BUN 21 05/29/2025    CREATININE 0.70 05/29/2025    EGFR 94 05/29/2025     LFTs  Lab Results   Component Value Date    ALT 11 05/29/2025    AST 14 05/29/2025    ALKPHOS 65 05/29/2025    BILITOT 0.8 05/29/2025     FLP  Lab Results   Component Value Date    TRIG 130 05/29/2025    CHOL 140 05/29/2025    LDLF 102 (H) 05/15/2023    LDLCALC 62 05/29/2025    HDL 57 05/29/2025     Urine Microalbumin  Lab Results   Component Value Date    MICROALBCREA  11/25/2024      Comment:      One or more analytes used in this calculation is outside of the analytical measurement range. Calculation cannot be performed.     Weight Management  Wt Readings from Last 3 Encounters:   03/13/25 69.9 kg (154 lb)   11/25/24 68 kg (150 lb)   04/04/24 69.9 kg (154 lb)      There is no height or weight on file to calculate BMI.     Assessment/Plan   Problem List Items Addressed This Visit       Type 2 diabetes mellitus without complication, without long-term current use of insulin    Patient's goal A1c is < 7%.  Is pt at goal? Yes, 6.8  Patient's SMBGs are 134,142,146,129,128,137,134,143,144,122,143,150,150,152- avg- 139.     Rationale for plan: pt reported  increase in BG compared to last month (had been forgetting to take metformin doses due to taking care of ). Pt tolerated ozempic well no with no ALYCE. Advised pt to  incorporate protein within diet to preserve muscle mass.  Pt can stop metformin as she has already been skipping doses. Due to uncontrolled BG and tolerating ozempic will plan to increase ozempic to 0.5mg weekly.  Pt expressed stopping statin due to controlled LDL. Discussed 30%+ decrease added by atorvastatin 10mg, pt believes she can control and maintain LDL <70 with diet and weight loss. Advised pt to complete Lipid panel prior to next appt and if LDL is >70, will have to restart. Discussed importance of controlled LDL, ASCVD Risk, MI/Stroke Risk. Pt also reported stopping losartan as home BP is consistently less than 120/80.     Medication Changes:  STOP  Metformin  mg 1 tabs bid  Ozempic 0.25mg weekly  START  Ozempic 0.5 mg weekly    Future Considerations:  Titrate ozempic q2-3 months as pt is hyperreactive  Pt will stop atorvastatin. Agreed to complete LDL prior to next appt. Plan to restart if >70         Relevant Medications    semaglutide 0.25 mg or 0.5 mg (2 mg/3 mL) pen injector    Other Relevant Orders    LDL cholesterol, direct    Referral to Clinical Pharmacy                   Clinical Pharmacist follow-up: 4 weeks 07/02/2025 11 am,    Continue all meds under the continuation of care with the referring provider and clinical pharmacy team.    Thank you,  Nay Garcia PharmD [facility]  Clinical Pharmacy Resident    Verbal consent to manage patient's drug therapy was obtained from the patient. They were informed they may decline to participate or withdraw from participation in pharmacy services at any time.

## 2025-07-01 NOTE — PROGRESS NOTES
Clinical Pharmacy Appointment    Patient ID: Edith Christian is a 69 y.o. female who presents for Diabetes.    Pt is here for Follow Up appointment.     Referring Provider: Hawa Hill MD M*     Patient Assistance for Ozempic approved through 1/9/2026. Will have to be renewed prior to that date to prevent lapse in coverage. Medication(s) will be received at no cost to patient from [facility].    Interval Hx:  passed away a week and a half ago     Subjective       HPI  DIABETES MELLITUS TYPE 2:    Diagnosed with diabetes:  NA. Known diabetic complications: NA.  Does patient follow with Endocrinology: No  Last optometry exam: 06/2024  Most recent visit in Podiatry: 06/2024-- patient denies sores or cuts on feet today      Current diabetic medications include:  Metformin  mg - 1 tab twice daily   Ozempic 0.25 mg weekly    Clarifications to above regimen: did not increase Ozempic dose as previosuly discussed   Adverse Effects: NA    Past diabetic medications include:  Farxiga    Glucose Readings:  Glucometer/CGM Type: glucometer  Patient tests BG 1 times per day in morning    Current home BG readings: 153 131 155 155 125 145 133 152 154 133 133 167 140 131 122 138 - Avg 142  Previous home BG readings: Avg 139     Weight: 150 --> 127.8 lbs      Any episodes of hypoglycemia? No,  .  Did patient treat episode of hypoglycemia appropriately? No,    Does the patient have a prescription for ready-to-use Glucagon? Not on insulin  Does pt have proteinuria? No    Lifestyle:  Diet: 3 meals/day. Cereal/fruit for breakfast. Balanced diet. Reports stopped eating fast food and cooks meals at home.   [place]: [place] [facility] of [place] but stopped since  diagnosed with cancer    Secondary Prevention:  Statin? Yes  ACE-I/ARB? Yes  Aspirin? No    Pertinent PMH Review:  PMH of Pancreatitis: No  PMH of Retinopathy: No  PMH of Urinary Tract Infections: Yes, resolved.   PMH of  MTC: No, had breast cancer 22 years ago. Went in to remission 22 years ago.       Medication Reconciliation:  Changed:   Added:   Discontinued: Dexcompt     Drug Interactions  No relevant drug interactions were noted.      Objective   No Known Allergies  Social History     Social History Narrative    Not on file        Vitals  BP Readings from Last 2 Encounters:   03/13/25 123/70   11/25/24 142/80     BMI Readings from Last 1 Encounters:   03/13/25 25.63 kg/m²      Labs  A1C  Lab Results   Component Value Date    HGBA1C 6.8 (H) 05/29/2025    HGBA1C 7.0 (H) 03/13/2025    HGBA1C 7.5 (H) 11/25/2024     BMP  Lab Results   Component Value Date    CALCIUM 9.7 05/29/2025     05/29/2025    K 4.2 05/29/2025    CO2 25 05/29/2025     05/29/2025    BUN 21 05/29/2025    CREATININE 0.70 05/29/2025    EGFR 94 05/29/2025     LFTs  Lab Results   Component Value Date    ALT 11 05/29/2025    AST 14 05/29/2025    ALKPHOS 65 05/29/2025    BILITOT 0.8 05/29/2025     FLP  Lab Results   Component Value Date    TRIG 130 05/29/2025    CHOL 140 05/29/2025    LDLF 102 (H) 05/15/2023    LDLCALC 62 05/29/2025    HDL 57 05/29/2025     Urine Microalbumin  Lab Results   Component Value Date    MICROALBCREA  11/25/2024      Comment:      One or more analytes used in this calculation is outside of the analytical measurement range. Calculation cannot be performed.     Weight Management  Wt Readings from Last 3 Encounters:   03/13/25 69.9 kg (154 lb)   11/25/24 68 kg (150 lb)   04/04/24 69.9 kg (154 lb)      There is no height or weight on file to calculate BMI.     Assessment/Plan   Problem List Items Addressed This Visit       Type 2 diabetes mellitus without complication, without long-term current use of insulin    Patient's goal A1c is < 7%.  Is pt at goal? Yes, 6.8%  Patient's SMBGs are mildly elevated FVG averaging 142 mg/dL .     Rationale for plan: Patient reports 25 lbs of weight loss since starting Ozempic, does not want to lose  any more weight and concerned a dose increase would result in more weight loss. Agreeable to switching to Trulicity for less likely weight loss and BG efficacy at lowest dose.     Medication Changes:  CONTINUE  Metformin  mg - 1 tablet twice daily   STOP  Ozempic  START  Trulicity 0.75 mg/0.5 mL - once weekly     Future Considerations:  May consider DPP-4 if weight loss still continues     Monitoring and Education:  Continue home BG monitoring                     Clinical Pharmacist follow-up:     Continue all meds under the continuation of care with the referring provider and clinical pharmacy team.    Thank you,  Roz MelgarD, BCACP  Clinical Pharmacy Specialist, Primary Care       Verbal consent to manage patient's drug therapy was obtained from the patient. They were informed they may decline to participate or withdraw from participation in pharmacy services at any time.

## 2025-07-02 ENCOUNTER — APPOINTMENT (OUTPATIENT)
Dept: PHARMACY | Facility: HOSPITAL | Age: 70
End: 2025-07-02
Payer: MEDICARE

## 2025-07-02 DIAGNOSIS — E11.9 TYPE 2 DIABETES MELLITUS WITHOUT COMPLICATION, WITHOUT LONG-TERM CURRENT USE OF INSULIN: ICD-10-CM

## 2025-07-02 PROCEDURE — RXMED WILLOW AMBULATORY MEDICATION CHARGE

## 2025-07-02 RX ORDER — DULAGLUTIDE 0.75 MG/.5ML
0.75 INJECTION, SOLUTION SUBCUTANEOUS WEEKLY
Qty: 2 ML | Refills: 1 | Status: SHIPPED | OUTPATIENT
Start: 2025-07-02

## 2025-07-02 NOTE — ASSESSMENT & PLAN NOTE
Patient's goal A1c is < 7%.  Is pt at goal? Yes, 6.8%  Patient's SMBGs are mildly elevated FVG averaging 142 mg/dL .     Rationale for plan: Patient reports 25 lbs of weight loss since starting Ozempic, does not want to lose any more weight and concerned a dose increase would result in more weight loss. Agreeable to switching to Trulicity for less likely weight loss and BG efficacy at lowest dose.     Medication Changes:  CONTINUE  Metformin  mg - 1 tablet twice daily   STOP  Ozempic  START  Trulicity 0.75 mg/0.5 mL - once weekly     Future Considerations:  May consider DPP-4 if weight loss still continues     Monitoring and Education:  Continue home BG monitoring

## 2025-07-05 ENCOUNTER — PHARMACY VISIT (OUTPATIENT)
Dept: PHARMACY | Facility: CLINIC | Age: 70
End: 2025-07-05
Payer: COMMERCIAL

## 2025-07-11 DIAGNOSIS — E11.9 TYPE 2 DIABETES MELLITUS WITHOUT COMPLICATION, WITHOUT LONG-TERM CURRENT USE OF INSULIN: ICD-10-CM

## 2025-07-11 RX ORDER — BLOOD SUGAR DIAGNOSTIC
STRIP MISCELLANEOUS
Qty: 200 STRIP | Refills: 3 | Status: SHIPPED | OUTPATIENT
Start: 2025-07-11

## 2025-07-25 DIAGNOSIS — E78.5 DYSLIPIDEMIA ASSOCIATED WITH TYPE 2 DIABETES MELLITUS: Primary | ICD-10-CM

## 2025-07-25 DIAGNOSIS — E11.69 DYSLIPIDEMIA ASSOCIATED WITH TYPE 2 DIABETES MELLITUS: Primary | ICD-10-CM

## 2025-07-25 LAB — LDLC SERPL DIRECT ASSAY-MCNC: 144 MG/DL

## 2025-07-29 ENCOUNTER — APPOINTMENT (OUTPATIENT)
Dept: PHARMACY | Facility: HOSPITAL | Age: 70
End: 2025-07-29
Payer: MEDICARE

## 2025-07-29 DIAGNOSIS — E11.9 TYPE 2 DIABETES MELLITUS WITHOUT COMPLICATION, WITHOUT LONG-TERM CURRENT USE OF INSULIN: Primary | ICD-10-CM

## 2025-07-29 NOTE — ASSESSMENT & PLAN NOTE
Patient's goal A1c is < 7%.  Is pt at goal? Yes, 6.8%  Patient's SMBGs are improved- FBG avg 130 mg/dL.     Rationale for plan: Given previous trouble with weight loss on Ozempic will continue current dose for another month and assess need for increase. Some trouble with constipation so has been eating more fruit.     Medication Changes:  CONTINUE  Metformin  mg - 1 tab twice daily   Trulicity 0.75 mg/0.5 mL - once weekly   RE-START  Atorvastatin 10 mg - 1 tablet daily     Monitoring and Education:  Discussed fiber supplement to assist with constipation while avoiding extra sugar and carb content from fruits   Patient is restarting Atorvastatin and will repeat Lipid panel in ~ 4 weeks

## 2025-07-29 NOTE — Clinical Note
Patient had stopped her Atorvastatin believing she could control her LDL with lifestyle alone, will now restart and recheck panel in 4 weeks.

## 2025-07-29 NOTE — PROGRESS NOTES
Clinical Pharmacy Appointment    Patient ID: Edith Christian is a 69 y.o. female who presents for Diabetes.    Pt is here for Follow Up appointment.     Referring Provider: Hawa Hill MD M*  Last visit: 3/13/25  Next visit: 1/14/26       Patient Assistance for Trulicity approved through 1/9/2026. Will have to be renewed prior to that date to prevent lapse in coverage. Medication(s) will be received at no cost to patient from [facility].    Interval Hx:     Subjective       HPI  DIABETES MELLITUS TYPE 2:    Diagnosed with diabetes:  NA. Known diabetic complications: NA.  Does patient follow with Endocrinology: No  Last optometry exam: 06/2024  Most recent visit in Podiatry: 06/2024-- patient denies sores or cuts on feet today      Current diabetic medications include:  Metformin  mg - 1 tab twice daily   Trulicity 0.75 mg/0.5 mL - once weekly     Clarifications to above regimen: 3 doses of Trulicity   Adverse Effects: NA    Past diabetic medications include:  Farxiga  Ozempic - too much weight loss    Glucose Readings:  Glucometer/CGM Type: glucometer  Patient tests BG 1 times per day in morning    Current home BG readings: 131, 132, 129, 120, 131, 124, 138, 134, 111, 139, 143  Avg 130 mg/dL     Previous home BG readings: Avg 142    Weight: 150 --> 127.8 lbs  --> 135 lbs    Any episodes of hypoglycemia? No,  .      Lifestyle:  Diet: 3 meals/day. Cereal/fruit for breakfast. Balanced diet. Reports stopped eating fast food and cooks meals at home.   [place]: [place] end of [place] but stopped since  diagnosed with cancer    Secondary Prevention:  Statin? No- patient had wanted to stop medication, believed she could control through lifestyle alone  ACE-I/ARB? Yes  Aspirin? No    Pertinent PMH Review:  PMH of Pancreatitis: No  PMH of Retinopathy: No  PMH of Urinary Tract Infections: Yes, resolved.   PMH of MTC: No, had breast cancer 22 years ago. Went in to  remission 22 years ago.     Objective   No Known Allergies  Social History     Social History Narrative    Not on file        Vitals  BP Readings from Last 2 Encounters:   03/13/25 123/70   11/25/24 142/80     BMI Readings from Last 1 Encounters:   03/13/25 25.63 kg/m²      Labs  A1C  Lab Results   Component Value Date    HGBA1C 6.8 (H) 05/29/2025    HGBA1C 7.0 (H) 03/13/2025    HGBA1C 7.5 (H) 11/25/2024     BMP  Lab Results   Component Value Date    CALCIUM 9.7 05/29/2025     05/29/2025    K 4.2 05/29/2025    CO2 25 05/29/2025     05/29/2025    BUN 21 05/29/2025    CREATININE 0.70 05/29/2025    EGFR 94 05/29/2025     LFTs  Lab Results   Component Value Date    ALT 11 05/29/2025    AST 14 05/29/2025    ALKPHOS 65 05/29/2025    BILITOT 0.8 05/29/2025     FLP  Lab Results   Component Value Date    TRIG 130 05/29/2025    CHOL 140 05/29/2025    LDLF 102 (H) 05/15/2023    LDLCALC 62 05/29/2025    HDL 57 05/29/2025     Urine Microalbumin  Lab Results   Component Value Date    MICROALBCREA  11/25/2024      Comment:      One or more analytes used in this calculation is outside of the analytical measurement range. Calculation cannot be performed.     Weight Management  Wt Readings from Last 3 Encounters:   03/13/25 69.9 kg (154 lb)   11/25/24 68 kg (150 lb)   04/04/24 69.9 kg (154 lb)      There is no height or weight on file to calculate BMI.     Assessment/Plan   Problem List Items Addressed This Visit       Type 2 diabetes mellitus without complication, without long-term current use of insulin - Primary    Patient's goal A1c is < 7%.  Is pt at goal? Yes, 6.8%  Patient's SMBGs are improved- FBG avg 130 mg/dL.     Rationale for plan: Given previous trouble with weight loss on Ozempic will continue current dose for another month and assess need for increase. Some trouble with constipation so has been eating more fruit.     Medication Changes:  CONTINUE  Metformin  mg - 1 tab twice daily   Trulicity 0.75  mg/0.5 mL - once weekly   RE-START  Atorvastatin 10 mg - 1 tablet daily     Monitoring and Education:  Discussed fiber supplement to assist with constipation while avoiding extra sugar and carb content from fruits   Patient is restarting Atorvastatin and will repeat Lipid panel in ~ 4 weeks             Clinical Pharmacist follow-up: 4 weeks    Continue all meds under the continuation of care with the referring provider and clinical pharmacy team.    Thank you,  Roz Lisa PharmD, BCACP  Clinical Pharmacy Specialist, Primary Care       Verbal consent to manage patient's drug therapy was obtained from the patient. They were informed they may decline to participate or withdraw from participation in pharmacy services at any time.

## 2025-07-30 ENCOUNTER — PHARMACY VISIT (OUTPATIENT)
Dept: PHARMACY | Facility: CLINIC | Age: 70
End: 2025-07-30
Payer: COMMERCIAL

## 2025-07-30 PROCEDURE — RXMED WILLOW AMBULATORY MEDICATION CHARGE

## 2025-08-23 LAB
CHOLEST SERPL-MCNC: 184 MG/DL
CHOLEST/HDLC SERPL: 2.8 (CALC)
HDLC SERPL-MCNC: 66 MG/DL
LDLC SERPL CALC-MCNC: 101 MG/DL (CALC)
NONHDLC SERPL-MCNC: 118 MG/DL (CALC)
TRIGL SERPL-MCNC: 79 MG/DL

## 2025-08-26 ENCOUNTER — PHARMACY VISIT (OUTPATIENT)
Dept: PHARMACY | Facility: CLINIC | Age: 70
End: 2025-08-26
Payer: COMMERCIAL

## 2025-08-26 ENCOUNTER — APPOINTMENT (OUTPATIENT)
Dept: PHARMACY | Facility: HOSPITAL | Age: 70
End: 2025-08-26
Payer: MEDICARE

## 2025-08-26 DIAGNOSIS — E11.9 TYPE 2 DIABETES MELLITUS WITHOUT COMPLICATION, WITHOUT LONG-TERM CURRENT USE OF INSULIN: ICD-10-CM

## 2025-08-26 PROCEDURE — RXMED WILLOW AMBULATORY MEDICATION CHARGE

## 2025-08-26 RX ORDER — DULAGLUTIDE 1.5 MG/.5ML
1.5 INJECTION, SOLUTION SUBCUTANEOUS WEEKLY
Qty: 2 ML | Refills: 2 | Status: SHIPPED | OUTPATIENT
Start: 2025-08-26

## 2025-08-29 ENCOUNTER — APPOINTMENT (OUTPATIENT)
Dept: OPHTHALMOLOGY | Facility: CLINIC | Age: 70
End: 2025-08-29
Payer: MEDICARE

## 2025-08-29 DIAGNOSIS — Z83.518 FAMILY HISTORY OF MACULAR DEGENERATION: ICD-10-CM

## 2025-08-29 DIAGNOSIS — H52.223 REGULAR ASTIGMATISM OF BOTH EYES: ICD-10-CM

## 2025-08-29 DIAGNOSIS — H52.13 MYOPIA OF BOTH EYES: ICD-10-CM

## 2025-08-29 DIAGNOSIS — H52.4 PRESBYOPIA: ICD-10-CM

## 2025-08-29 DIAGNOSIS — H43.812 PVD (POSTERIOR VITREOUS DETACHMENT), LEFT EYE: ICD-10-CM

## 2025-08-29 DIAGNOSIS — H25.13 NUCLEAR SCLEROTIC CATARACT OF BOTH EYES: ICD-10-CM

## 2025-08-29 DIAGNOSIS — E11.9 TYPE 2 DIABETES MELLITUS WITHOUT COMPLICATION, WITHOUT LONG-TERM CURRENT USE OF INSULIN: Primary | ICD-10-CM

## 2025-08-29 PROBLEM — H52.203 ASTIGMATISM OF BOTH EYES: Status: ACTIVE | Noted: 2025-08-29

## 2025-08-29 PROCEDURE — 99214 OFFICE O/P EST MOD 30 MIN: CPT | Performed by: OPTOMETRIST

## 2025-08-29 PROCEDURE — 92134 CPTRZ OPH DX IMG PST SGM RTA: CPT | Performed by: OPTOMETRIST

## 2025-08-29 PROCEDURE — 92015 DETERMINE REFRACTIVE STATE: CPT | Performed by: OPTOMETRIST

## 2025-08-29 PROCEDURE — 2023F DILAT RTA XM W/O RTNOPTHY: CPT | Performed by: OPTOMETRIST

## 2025-08-29 ASSESSMENT — CONF VISUAL FIELD
OD_SUPERIOR_NASAL_RESTRICTION: 0
OD_SUPERIOR_TEMPORAL_RESTRICTION: 0
OS_INFERIOR_NASAL_RESTRICTION: 0
OD_INFERIOR_TEMPORAL_RESTRICTION: 0
OS_NORMAL: 1
OS_SUPERIOR_TEMPORAL_RESTRICTION: 0
OD_INFERIOR_NASAL_RESTRICTION: 0
OD_NORMAL: 1
OS_SUPERIOR_NASAL_RESTRICTION: 0
OS_INFERIOR_TEMPORAL_RESTRICTION: 0
METHOD: COUNTING FINGERS

## 2025-08-29 ASSESSMENT — ENCOUNTER SYMPTOMS
MUSCULOSKELETAL NEGATIVE: 0
PSYCHIATRIC NEGATIVE: 0
EYES NEGATIVE: 0
ALLERGIC/IMMUNOLOGIC NEGATIVE: 0
CARDIOVASCULAR NEGATIVE: 0
ENDOCRINE NEGATIVE: 0
GASTROINTESTINAL NEGATIVE: 0
HEMATOLOGIC/LYMPHATIC NEGATIVE: 0
CONSTITUTIONAL NEGATIVE: 0
RESPIRATORY NEGATIVE: 0
NEUROLOGICAL NEGATIVE: 0

## 2025-08-29 ASSESSMENT — VISUAL ACUITY
OD_CC: 20/20
OS_CC+: -3
CORRECTION_TYPE: GLASSES
OS_CC: 20/20
OD_CC+: -3
METHOD: SNELLEN - LINEAR

## 2025-08-29 ASSESSMENT — REFRACTION_MANIFEST
OD_SPHERE: -2.75
OD_AXIS: 010
OD_CYLINDER: +2.50
OS_ADD: +2.75
OS_CYLINDER: +1.00
OD_ADD: +2.75
OS_AXIS: 135
OS_SPHERE: -3.00

## 2025-08-29 ASSESSMENT — REFRACTION_WEARINGRX
OS_AXIS: 149
OS_ADD: +2.50
OS_SPHERE: -2.50
OD_SPHERE: -3.00
OD_AXIS: 005
OD_ADD: +2.50
OD_CYLINDER: +2.50
OS_CYLINDER: +1.00

## 2025-08-29 ASSESSMENT — REFRACTION
OS_SPHERE: -3.25
OD_SPHERE: -3.00
OS_AXIS: 135
OS_CYLINDER: +1.25
OD_CYLINDER: +2.50
OD_AXIS: 010

## 2025-08-29 ASSESSMENT — TONOMETRY
IOP_METHOD: GOLDMANN APPLANATION
OD_IOP_MMHG: 18
OS_IOP_MMHG: 19

## 2025-08-29 ASSESSMENT — EXTERNAL EXAM - LEFT EYE: OS_EXAM: NORMAL

## 2025-08-29 ASSESSMENT — SLIT LAMP EXAM - LIDS
COMMENTS: GOOD POSITION
COMMENTS: GOOD POSITION

## 2025-08-29 ASSESSMENT — CUP TO DISC RATIO
OD_RATIO: 0.3
OS_RATIO: 0.2

## 2025-08-29 ASSESSMENT — EXTERNAL EXAM - RIGHT EYE: OD_EXAM: NORMAL

## 2025-09-24 ENCOUNTER — APPOINTMENT (OUTPATIENT)
Dept: PHARMACY | Facility: HOSPITAL | Age: 70
End: 2025-09-24
Payer: MEDICARE

## 2026-09-01 ENCOUNTER — APPOINTMENT (OUTPATIENT)
Dept: OPHTHALMOLOGY | Facility: CLINIC | Age: 71
End: 2026-09-01
Payer: MEDICARE